# Patient Record
Sex: FEMALE | Race: WHITE | NOT HISPANIC OR LATINO | Employment: FULL TIME | URBAN - METROPOLITAN AREA
[De-identification: names, ages, dates, MRNs, and addresses within clinical notes are randomized per-mention and may not be internally consistent; named-entity substitution may affect disease eponyms.]

---

## 2017-03-06 ENCOUNTER — ALLIED HEALTH/NURSE VISIT (OUTPATIENT)
Dept: FAMILY MEDICINE | Facility: CLINIC | Age: 22
End: 2017-03-06
Payer: COMMERCIAL

## 2017-03-06 DIAGNOSIS — Z23 ENCOUNTER FOR IMMUNIZATION: Primary | ICD-10-CM

## 2017-03-06 PROCEDURE — 90471 IMMUNIZATION ADMIN: CPT

## 2017-03-06 PROCEDURE — 99207 ZZC NO CHARGE NURSE ONLY: CPT

## 2017-03-06 PROCEDURE — 90715 TDAP VACCINE 7 YRS/> IM: CPT

## 2017-03-08 ENCOUNTER — OFFICE VISIT (OUTPATIENT)
Dept: FAMILY MEDICINE | Facility: CLINIC | Age: 22
End: 2017-03-08
Payer: COMMERCIAL

## 2017-03-08 VITALS
HEIGHT: 65 IN | WEIGHT: 130.8 LBS | OXYGEN SATURATION: 99 % | BODY MASS INDEX: 21.79 KG/M2 | TEMPERATURE: 98.6 F | SYSTOLIC BLOOD PRESSURE: 127 MMHG | DIASTOLIC BLOOD PRESSURE: 78 MMHG | HEART RATE: 88 BPM

## 2017-03-08 DIAGNOSIS — N63.15 BREAST LUMP ON RIGHT SIDE AT 9 O'CLOCK POSITION: Primary | ICD-10-CM

## 2017-03-08 PROCEDURE — 99213 OFFICE O/P EST LOW 20 MIN: CPT | Performed by: INTERNAL MEDICINE

## 2017-03-08 NOTE — NURSING NOTE
"Chief Complaint   Patient presents with     Breast Mass     x 4 months, changing in size and texture        Initial /78 (BP Location: Right arm, Patient Position: Chair, Cuff Size: Adult Regular)  Pulse 88  Temp 98.6  F (37  C) (Tympanic)  Ht 5' 4.5\" (1.638 m)  Wt 130 lb 12.8 oz (59.3 kg)  SpO2 99%  BMI 22.11 kg/m2 Estimated body mass index is 22.11 kg/(m^2) as calculated from the following:    Height as of this encounter: 5' 4.5\" (1.638 m).    Weight as of this encounter: 130 lb 12.8 oz (59.3 kg).  Medication Reconciliation: complete   Sherice HARMON CMA (AAMA)    "

## 2017-03-08 NOTE — MR AVS SNAPSHOT
After Visit Summary   3/8/2017    Marian Sanon    MRN: [id number]           Patient Information     Date Of Birth          1995        Visit Information        Provider Department      3/8/2017 9:00 AM Blas Chavez MD [facility]        Today's Diagnoses     Breast lump on right side at 9 o'clock position    -  1       Follow-ups after your visit        Future tests that were ordered for you today     Open Future Orders        Priority Expected Expires Ordered    MA Diagnostic Digital Bilateral Routine  3/8/2018 3/8/2017    US Breast Right Limited 1-3 Quadrants Routine  3/8/2018 3/8/2017            Who to contact     If you have questions or need follow up information about today's clinic visit or your schedule please contact [facility] directly at [phone].  Normal or non-critical lab and imaging results will be communicated to you by MyChart, letter or phone within 4 business days after the clinic has received the results. If you do not hear from us within 7 days, please contact the clinic through MyChart or phone. If you have a critical or abnormal lab result, we will notify you by phone as soon as possible.  Submit refill requests through [employer] or call your pharmacy and they will forward the refill request to us. Please allow 3 business days for your refill to be completed.          Additional Information About Your Visit        MyChart Information     [employer] gives you secure access to your electronic health record. If you see a primary care provider, you can also send messages to your care team and make appointments. If you have questions, please call your primary care clinic.  If you do not have a primary care provider, please call [phone] and they will assist you.        Care EveryWhere ID     This is your Care EveryWhere ID. This could be used by other organizations to access your [place] medical records  [id number]        Your Vitals Were   "   Pulse Temperature Height Pulse Oximetry BMI (Body Mass Index)       88 98.6  F (37  C) (Tympanic) 5' 4.5\" (1.638 m) 99% 22.11 kg/m2        Blood Pressure from Last 3 Encounters:   03/08/17 127/78   11/10/16 117/72   04/01/15 108/54    Weight from Last 3 Encounters:   03/08/17 130 lb 12.8 oz (59.3 kg)   11/10/16 130 lb (59 kg)   04/01/15 132 lb 6.4 oz (60.1 kg)               Primary Care Provider Office Phone #    [facility] [phone] [address] [facility] [phone]        Thank you!     Thank you for choosing [facility]  for your care. Our goal is always to provide you with excellent care. Hearing back from our patients is one way we can continue to improve our services. Please take a few minutes to complete the written survey that you may receive in the mail after your visit with us. Thank you!             Your Updated Medication List - Protect others around you: Learn how to safely use, store and throw away your medicines at www.disposemymeds.org.          This list is accurate as of: 3/8/17  9:17 AM.  Always use your most recent med list.                   Brand Name Dispense Instructions for use    albuterol 108 (90 BASE) MCG/ACT Inhaler    PROAIR HFA/PROVENTIL HFA/VENTOLIN HFA    1 Inhaler    Inhale 2 puffs into the lungs every 4 hours as needed for shortness of breath / dyspnea       etonogestrel 68 MG Impl    IMPLANON/NEXPLANON     1 each (68 mg) by Subdermal route continuous         "

## 2017-03-08 NOTE — PROGRESS NOTES
"  SUBJECTIVE:                                                    Marian Sanon is a 22 year old female who presents to clinic today for the following health issues:  Chief Complaint   Patient presents with     Breast Mass     x 4 months, changing in size and texture      Breast mass       Duration: x 4 months     Description (location/character/radiation): right breast mass, quarter size, patient reports change in size and texture.  Started pea size and firm, now larger and seems more fibrous. Some pain w/ bra touching area too long toward the end of the day, no skin changes or nipple discharge    Intensity:  mild    Accompanying signs and symptoms: as noted     History (similar episodes/previous evaluation): None    Precipitating or alleviating factors: None    Therapies tried and outcome: None     Her aunt has a history of cancer in the breast and ovary at age 30    Problem list and histories reviewed & adjusted, as indicated.  Additional history: as documented    Current Outpatient Prescriptions   Medication Sig Dispense Refill     etonogestrel (IMPLANON/NEXPLANON) 68 MG IMPL 1 each (68 mg) by Subdermal route continuous  0     albuterol (PROAIR HFA, PROVENTIL HFA, VENTOLIN HFA) 108 (90 BASE) MCG/ACT inhaler Inhale 2 puffs into the lungs every 4 hours as needed for shortness of breath / dyspnea 1 Inhaler 1     No Known Allergies    Reviewed and updated as needed this visit by clinical staff  Tobacco  Allergies  Med Hx  Surg Hx  Fam Hx  Soc Hx      Reviewed and updated as needed this visit by Provider         ROS:  Constitutional, breast systems are negative, except as otherwise noted.    OBJECTIVE:                                                    /78 (BP Location: Right arm, Patient Position: Chair, Cuff Size: Adult Regular)  Pulse 88  Temp 98.6  F (37  C) (Tympanic)  Ht 5' 4.5\" (1.638 m)  Wt 130 lb 12.8 oz (59.3 kg)  SpO2 99%  BMI 22.11 kg/m2  Body mass index is 22.11 kg/(m^2).  GENERAL: " healthy, alert and no distress  BREAST: mobile lump in right breast around 9 o'clock about 2-3 cm in size, fibrous in texture, not well defined       ASSESSMENT/PLAN:                                                        1. Breast lump on right side at 9 o'clock position    Will pursue both ultrasound and mammogram for further diagnosis.  Aunt has a history of breast and ovarian cancer at age 30.      - MA Diagnostic Digital Bilateral; Future  - US Breast Right Limited 1-3 Quadrants; Future    Follow-up as needed.      Blas Chavez MD  [facility]

## 2017-03-16 ENCOUNTER — HOSPITAL ENCOUNTER (OUTPATIENT)
Dept: ULTRASOUND IMAGING | Facility: CLINIC | Age: 22
Discharge: HOME | End: 2017-03-16
Attending: INTERNAL MEDICINE | Admitting: INTERNAL MEDICINE
Payer: COMMERCIAL

## 2017-03-16 DIAGNOSIS — N63.15 BREAST LUMP ON RIGHT SIDE AT 9 O'CLOCK POSITION: ICD-10-CM

## 2017-03-16 PROCEDURE — 76642 ULTRASOUND BREAST LIMITED: CPT | Mod: RT

## 2019-11-03 ENCOUNTER — HEALTH MAINTENANCE LETTER (OUTPATIENT)
Age: 24
End: 2019-11-03

## 2020-02-10 ENCOUNTER — HEALTH MAINTENANCE LETTER (OUTPATIENT)
Age: 25
End: 2020-02-10

## 2020-11-16 ENCOUNTER — HEALTH MAINTENANCE LETTER (OUTPATIENT)
Age: 25
End: 2020-11-16

## 2021-09-18 ENCOUNTER — HEALTH MAINTENANCE LETTER (OUTPATIENT)
Age: 26
End: 2021-09-18

## 2022-01-08 ENCOUNTER — HEALTH MAINTENANCE LETTER (OUTPATIENT)
Age: 27
End: 2022-01-08

## 2022-11-19 ENCOUNTER — HEALTH MAINTENANCE LETTER (OUTPATIENT)
Age: 27
End: 2022-11-19

## 2023-04-09 ENCOUNTER — HEALTH MAINTENANCE LETTER (OUTPATIENT)
Age: 28
End: 2023-04-09

## 2023-06-08 DIAGNOSIS — Z31.49 PROCREATIVE INVESTIGATION AND TESTING: Primary | ICD-10-CM

## 2023-06-19 ENCOUNTER — HOSPITAL ENCOUNTER (OUTPATIENT)
Dept: ULTRASOUND IMAGING | Facility: CLINIC | Age: 28
Discharge: HOME | End: 2023-06-19
Admitting: OBSTETRICS & GYNECOLOGY

## 2023-06-19 ENCOUNTER — LAB (OUTPATIENT)
Dept: LAB | Facility: CLINIC | Age: 28
End: 2023-06-19

## 2023-06-19 DIAGNOSIS — Z31.49 INVESTIGATION AND TESTING FOR PROCREATION MANAGEMENT: ICD-10-CM

## 2023-06-19 DIAGNOSIS — Z31.49 PROCREATIVE INVESTIGATION AND TESTING: ICD-10-CM

## 2023-06-19 LAB
ESTRADIOL SERPL-MCNC: 22 PG/ML
PROGEST SERPL-MCNC: 0.2 NG/ML

## 2023-06-19 PROCEDURE — 82670 ASSAY OF TOTAL ESTRADIOL: CPT

## 2023-06-19 PROCEDURE — 36415 COLL VENOUS BLD VENIPUNCTURE: CPT

## 2023-06-19 PROCEDURE — 76830 TRANSVAGINAL US NON-OB: CPT

## 2023-06-20 DIAGNOSIS — Z31.9 UNSPECIFIED PROCREATIVE MANAGEMENT: Primary | ICD-10-CM

## 2023-06-21 ENCOUNTER — HOSPITAL ENCOUNTER (OUTPATIENT)
Dept: ULTRASOUND IMAGING | Facility: CLINIC | Age: 28
Discharge: HOME | End: 2023-06-21
Attending: OBSTETRICS & GYNECOLOGY | Admitting: OBSTETRICS & GYNECOLOGY

## 2023-06-21 ENCOUNTER — LAB (OUTPATIENT)
Dept: LAB | Facility: CLINIC | Age: 28
End: 2023-06-21

## 2023-06-21 DIAGNOSIS — N97.9 INFERTILITY, FEMALE: ICD-10-CM

## 2023-06-21 DIAGNOSIS — Z31.9 UNSPECIFIED PROCREATIVE MANAGEMENT: ICD-10-CM

## 2023-06-21 LAB — ESTRADIOL SERPL-MCNC: 66 PG/ML

## 2023-06-21 PROCEDURE — 82670 ASSAY OF TOTAL ESTRADIOL: CPT

## 2023-06-21 PROCEDURE — 36415 COLL VENOUS BLD VENIPUNCTURE: CPT

## 2023-06-21 PROCEDURE — 76830 TRANSVAGINAL US NON-OB: CPT

## 2023-06-23 DIAGNOSIS — Z31.9 UNSPECIFIED PROCREATIVE MANAGEMENT: Primary | ICD-10-CM

## 2023-06-30 ENCOUNTER — HOSPITAL ENCOUNTER (OUTPATIENT)
Dept: ULTRASOUND IMAGING | Facility: CLINIC | Age: 28
Discharge: HOME | End: 2023-06-30
Admitting: NURSE PRACTITIONER

## 2023-06-30 DIAGNOSIS — Z31.49 INVESTIGATION AND TESTING FOR PROCREATION MANAGEMENT: ICD-10-CM

## 2023-06-30 PROCEDURE — 76830 TRANSVAGINAL US NON-OB: CPT

## 2023-07-06 ENCOUNTER — HOSPITAL ENCOUNTER (OUTPATIENT)
Dept: ULTRASOUND IMAGING | Facility: CLINIC | Age: 28
Discharge: HOME | End: 2023-07-06
Admitting: OBSTETRICS & GYNECOLOGY

## 2023-07-06 ENCOUNTER — LAB (OUTPATIENT)
Dept: LAB | Facility: CLINIC | Age: 28
End: 2023-07-06

## 2023-07-06 DIAGNOSIS — Z31.49 INVESTIGATION AND TESTING FOR PROCREATION MANAGEMENT: ICD-10-CM

## 2023-07-06 DIAGNOSIS — Z31.9 UNSPECIFIED PROCREATIVE MANAGEMENT: ICD-10-CM

## 2023-07-06 LAB
ESTRADIOL SERPL-MCNC: 164 PG/ML
PROGEST SERPL-MCNC: 0.3 NG/ML

## 2023-07-06 PROCEDURE — 76830 TRANSVAGINAL US NON-OB: CPT

## 2023-07-06 PROCEDURE — 82670 ASSAY OF TOTAL ESTRADIOL: CPT

## 2023-07-06 PROCEDURE — 36415 COLL VENOUS BLD VENIPUNCTURE: CPT

## 2023-07-06 PROCEDURE — 84144 ASSAY OF PROGESTERONE: CPT

## 2023-08-07 ENCOUNTER — HOSPITAL ENCOUNTER (OUTPATIENT)
Dept: ULTRASOUND IMAGING | Facility: CLINIC | Age: 28
Discharge: HOME | End: 2023-08-07
Attending: OBSTETRICS & GYNECOLOGY | Admitting: OBSTETRICS & GYNECOLOGY

## 2023-08-07 DIAGNOSIS — Z31.9 ENCOUNTER FOR PROCREATIVE MANAGEMENT, UNSPECIFIED: ICD-10-CM

## 2023-08-07 PROCEDURE — 76801 OB US < 14 WKS SINGLE FETUS: CPT

## 2023-08-14 ENCOUNTER — LAB (OUTPATIENT)
Dept: LAB | Facility: CLINIC | Age: 28
End: 2023-08-14

## 2023-08-14 ENCOUNTER — HOSPITAL ENCOUNTER (OUTPATIENT)
Dept: ULTRASOUND IMAGING | Facility: CLINIC | Age: 28
Discharge: HOME | End: 2023-08-14
Attending: OBSTETRICS & GYNECOLOGY | Admitting: OBSTETRICS & GYNECOLOGY

## 2023-08-14 DIAGNOSIS — Z31.9 ENCOUNTER FOR INFERTILITY: Primary | ICD-10-CM

## 2023-08-14 DIAGNOSIS — Z31.9 ENCOUNTER FOR PROCREATIVE MANAGEMENT, UNSPECIFIED: ICD-10-CM

## 2023-08-14 LAB
ERYTHROCYTE [DISTWIDTH] IN BLOOD BY AUTOMATED COUNT: 11.7 % (ref 10–15)
HCT VFR BLD AUTO: 35.8 % (ref 35–47)
HGB BLD-MCNC: 11.9 G/DL (ref 11.7–15.7)
MCH RBC QN AUTO: 29.5 PG (ref 26.5–33)
MCHC RBC AUTO-ENTMCNC: 33.2 G/DL (ref 31.5–36.5)
MCV RBC AUTO: 89 FL (ref 78–100)
PLATELET # BLD AUTO: 353 10E3/UL (ref 150–450)
RBC # BLD AUTO: 4.04 10E6/UL (ref 3.8–5.2)
WBC # BLD AUTO: 10.2 10E3/UL (ref 4–11)

## 2023-08-14 PROCEDURE — 85027 COMPLETE CBC AUTOMATED: CPT

## 2023-08-14 PROCEDURE — 36415 COLL VENOUS BLD VENIPUNCTURE: CPT

## 2023-08-14 PROCEDURE — 76801 OB US < 14 WKS SINGLE FETUS: CPT

## 2023-08-28 ENCOUNTER — HOSPITAL ENCOUNTER (OUTPATIENT)
Dept: ULTRASOUND IMAGING | Facility: CLINIC | Age: 28
Discharge: HOME | End: 2023-08-28
Attending: OBSTETRICS & GYNECOLOGY | Admitting: OBSTETRICS & GYNECOLOGY

## 2023-08-28 DIAGNOSIS — Z31.9 ENCOUNTER FOR PROCREATIVE MANAGEMENT, UNSPECIFIED: ICD-10-CM

## 2023-08-28 PROCEDURE — 76801 OB US < 14 WKS SINGLE FETUS: CPT

## 2024-04-11 ENCOUNTER — HOSPITAL ENCOUNTER (EMERGENCY)
Facility: CLINIC | Age: 29
Discharge: HOME | End: 2024-04-11
Attending: EMERGENCY MEDICINE | Admitting: EMERGENCY MEDICINE
Payer: COMMERCIAL

## 2024-04-11 VITALS
DIASTOLIC BLOOD PRESSURE: 60 MMHG | OXYGEN SATURATION: 96 % | TEMPERATURE: 97.6 F | BODY MASS INDEX: 28.16 KG/M2 | HEIGHT: 65 IN | WEIGHT: 169 LBS | HEART RATE: 65 BPM | SYSTOLIC BLOOD PRESSURE: 92 MMHG | RESPIRATION RATE: 18 BRPM

## 2024-04-11 DIAGNOSIS — K80.20 SYMPTOMATIC CHOLELITHIASIS: ICD-10-CM

## 2024-04-11 LAB
ALBUMIN SERPL BCG-MCNC: 4.3 G/DL (ref 3.5–5.2)
ALBUMIN UR-MCNC: NEGATIVE MG/DL
ALP SERPL-CCNC: 132 U/L (ref 40–150)
ALT SERPL W P-5'-P-CCNC: 35 U/L (ref 0–50)
ANION GAP SERPL CALCULATED.3IONS-SCNC: 11 MMOL/L (ref 7–15)
APPEARANCE UR: CLEAR
AST SERPL W P-5'-P-CCNC: 31 U/L (ref 0–45)
BASOPHILS # BLD AUTO: 0.1 10E3/UL (ref 0–0.2)
BASOPHILS NFR BLD AUTO: 1 %
BILIRUB DIRECT SERPL-MCNC: <0.2 MG/DL (ref 0–0.3)
BILIRUB SERPL-MCNC: 0.2 MG/DL
BILIRUB UR QL STRIP: NEGATIVE
BUN SERPL-MCNC: 15.2 MG/DL (ref 6–20)
CALCIUM SERPL-MCNC: 9.7 MG/DL (ref 8.6–10)
CHLORIDE SERPL-SCNC: 102 MMOL/L (ref 98–107)
COLOR UR AUTO: YELLOW
CREAT SERPL-MCNC: 0.72 MG/DL (ref 0.51–0.95)
DEPRECATED HCO3 PLAS-SCNC: 25 MMOL/L (ref 22–29)
EGFRCR SERPLBLD CKD-EPI 2021: >90 ML/MIN/1.73M2
EOSINOPHIL # BLD AUTO: 0.3 10E3/UL (ref 0–0.7)
EOSINOPHIL NFR BLD AUTO: 5 %
ERYTHROCYTE [DISTWIDTH] IN BLOOD BY AUTOMATED COUNT: 12.5 % (ref 10–15)
GLUCOSE SERPL-MCNC: 96 MG/DL (ref 70–99)
GLUCOSE UR STRIP-MCNC: NEGATIVE MG/DL
HCT VFR BLD AUTO: 40.5 % (ref 35–47)
HGB BLD-MCNC: 13.3 G/DL (ref 11.7–15.7)
HGB UR QL STRIP: ABNORMAL
IMM GRANULOCYTES # BLD: 0 10E3/UL
IMM GRANULOCYTES NFR BLD: 0 %
KETONES UR STRIP-MCNC: NEGATIVE MG/DL
LEUKOCYTE ESTERASE UR QL STRIP: ABNORMAL
LIPASE SERPL-CCNC: 99 U/L (ref 13–60)
LYMPHOCYTES # BLD AUTO: 2.6 10E3/UL (ref 0.8–5.3)
LYMPHOCYTES NFR BLD AUTO: 41 %
MCH RBC QN AUTO: 30 PG (ref 26.5–33)
MCHC RBC AUTO-ENTMCNC: 32.8 G/DL (ref 31.5–36.5)
MCV RBC AUTO: 91 FL (ref 78–100)
MONOCYTES # BLD AUTO: 0.5 10E3/UL (ref 0–1.3)
MONOCYTES NFR BLD AUTO: 8 %
MUCOUS THREADS #/AREA URNS LPF: PRESENT /LPF
NEUTROPHILS # BLD AUTO: 2.9 10E3/UL (ref 1.6–8.3)
NEUTROPHILS NFR BLD AUTO: 45 %
NITRATE UR QL: NEGATIVE
NRBC # BLD AUTO: 0 10E3/UL
NRBC BLD AUTO-RTO: 0 /100
PH UR STRIP: 5 [PH] (ref 5–7)
PLATELET # BLD AUTO: 316 10E3/UL (ref 150–450)
POTASSIUM SERPL-SCNC: 4.2 MMOL/L (ref 3.4–5.3)
PROT SERPL-MCNC: 7.5 G/DL (ref 6.4–8.3)
RBC # BLD AUTO: 4.44 10E6/UL (ref 3.8–5.2)
RBC URINE: 26 /HPF
SODIUM SERPL-SCNC: 138 MMOL/L (ref 135–145)
SP GR UR STRIP: 1.03 (ref 1–1.03)
SQUAMOUS EPITHELIAL: 1 /HPF
UROBILINOGEN UR STRIP-MCNC: NORMAL MG/DL
WBC # BLD AUTO: 6.5 10E3/UL (ref 4–11)
WBC URINE: 4 /HPF

## 2024-04-11 PROCEDURE — 80076 HEPATIC FUNCTION PANEL: CPT | Performed by: EMERGENCY MEDICINE

## 2024-04-11 PROCEDURE — 36415 COLL VENOUS BLD VENIPUNCTURE: CPT | Performed by: EMERGENCY MEDICINE

## 2024-04-11 PROCEDURE — 99284 EMERGENCY DEPT VISIT MOD MDM: CPT | Mod: 25 | Performed by: EMERGENCY MEDICINE

## 2024-04-11 PROCEDURE — 81001 URINALYSIS AUTO W/SCOPE: CPT | Performed by: EMERGENCY MEDICINE

## 2024-04-11 PROCEDURE — 85025 COMPLETE CBC W/AUTO DIFF WBC: CPT | Performed by: EMERGENCY MEDICINE

## 2024-04-11 PROCEDURE — 80048 BASIC METABOLIC PNL TOTAL CA: CPT | Performed by: EMERGENCY MEDICINE

## 2024-04-11 PROCEDURE — 76705 ECHO EXAM OF ABDOMEN: CPT | Performed by: EMERGENCY MEDICINE

## 2024-04-11 PROCEDURE — 76705 ECHO EXAM OF ABDOMEN: CPT | Mod: 26 | Performed by: EMERGENCY MEDICINE

## 2024-04-11 PROCEDURE — 83690 ASSAY OF LIPASE: CPT | Performed by: EMERGENCY MEDICINE

## 2024-04-11 RX ORDER — MORPHINE SULFATE 15 MG/1
15 TABLET ORAL EVERY 4 HOURS PRN
Qty: 6 TABLET | Refills: 0 | Status: SHIPPED | OUTPATIENT
Start: 2024-04-11 | End: 2024-05-23

## 2024-04-11 RX ORDER — HYDROMORPHONE HYDROCHLORIDE 1 MG/ML
0.5 INJECTION, SOLUTION INTRAMUSCULAR; INTRAVENOUS; SUBCUTANEOUS
Status: DISCONTINUED | OUTPATIENT
Start: 2024-04-11 | End: 2024-04-11 | Stop reason: HOSPADM

## 2024-04-11 ASSESSMENT — ACTIVITIES OF DAILY LIVING (ADL)
ADLS_ACUITY_SCORE: 35
ADLS_ACUITY_SCORE: 35

## 2024-04-11 ASSESSMENT — COLUMBIA-SUICIDE SEVERITY RATING SCALE - C-SSRS
6. HAVE YOU EVER DONE ANYTHING, STARTED TO DO ANYTHING, OR PREPARED TO DO ANYTHING TO END YOUR LIFE?: NO
1. IN THE PAST MONTH, HAVE YOU WISHED YOU WERE DEAD OR WISHED YOU COULD GO TO SLEEP AND NOT WAKE UP?: NO
2. HAVE YOU ACTUALLY HAD ANY THOUGHTS OF KILLING YOURSELF IN THE PAST MONTH?: NO

## 2024-04-11 NOTE — ED TRIAGE NOTES
Pt states she developed upper right abdominal pain that started last night around 2300, 2.5 weeks post partum     Triage Assessment (Adult)       Row Name 04/11/24 0408          Triage Assessment    Airway WDL WDL        Respiratory WDL    Respiratory WDL WDL        Skin Circulation/Temperature WDL    Skin Circulation/Temperature WDL WDL        Cardiac WDL    Cardiac WDL WDL        Peripheral/Neurovascular WDL    Peripheral Neurovascular WDL WDL        Cognitive/Neuro/Behavioral WDL    Cognitive/Neuro/Behavioral WDL WDL

## 2024-04-11 NOTE — DISCHARGE INSTRUCTIONS
I believe you likely had pain from a gallstone that caused blockage of flow from your gallbladder.  I am glad you are feeling better now.  Return to the emergency department for worsening symptoms, severe pain you cannot control at home, repeated vomiting, or other concerns.  Otherwise follow-up in surgery clinic to discuss possible surgery to remove your gallbladder in the future.    Use ibuprofen and acetaminophen for your symptoms.  Use morphine as needed for pain that is not controlled by the prior two medications.    Morphine is an addictive opioid medication, please only take it when the pain is more than can be controlled by acetaminophen or ibuprofen alone. It will also make you lightheaded, nauseated, and constipated.  Do not drive, operate heavy machinery, or take care of young children while taking this medication. Do not mix it with other medications or drugs that will make you sleepy, such as alcohol.     Repeated studies have shown that the longer you use opioid pain medications, the longer it is until you return to normal function. It is our recommendation that you taper off opioids as quickly as possible with the goal of returning to normal function or near normal function. Long term use of opioids quickly results in growing tolerance to the medication (less of the benefits) and increased dependence (more of the bad side effects).     Pain is very difficult to treat and we can very rarely take away pain completely. If you are having difficulty with pain over several weeks after an injury, you may need to start different medications and therapies (such as physical therapy, graded exercise, massage, and acupuncture). Please talk about this with your regular doctor.     If you are interested in seeking free, confidential treatment referral and information service for individuals and families facing mental health and/or substance use disorders please call [phone] ([phone])

## 2024-04-11 NOTE — H&P (VIEW-ONLY)
History     Chief Complaint   Patient presents with    Abdominal Pain     HPI  Marian Sanon is a 29 year old female who presents for epigastric and right upper quadrant abdominal pain.  Symptoms started last evening, severe pain radiating to her back.  Some nausea and sweating.  She is feeling better now and it just started to improve as she got to the emergency department but had been bothering her for several hours.  No fevers.  She denies cough or chest pain.  No diarrhea, dysuria, hematuria.  She just had a normal vaginal delivery 2.5 weeks ago of a surrogacy pregnancy.  Still having some vaginal bleeding but is significant decreasing.  No abnormal vaginal discharge.  She had a similar episode the evening prior that resolved spontaneously in a shorter period of time.    Allergies:  No Known Allergies    Problem List:    Patient Active Problem List    Diagnosis Date Noted    Encounter for supervision of other normal pregnancy 02/13/2015     Priority: Medium     Diagnosis updated by automated process. Provider to review and confirm.      GBS (group B Streptococcus carrier), +RV culture, currently pregnant 01/09/2015     Priority: Medium      Beta hemolytic streptococcus group b isolated         AMPICILLIN JOSE MANUEL GRAM POS PANEL <=0.06 Susceptible ug/mL Final         CEFOTAXIME JOSE MANUEL GRAM POS PANEL <=0.25 Susceptible ug/mL Final         CEFTRIAXONE JOSE MANUEL GRAM POS PANEL <=0.25 Susceptible ug/mL Final         CLINDAMYCIN JOSE MANUEL GRAM POS PANEL <=0.06 Susceptible ug/mL Final         PENICILLIN JOSE MANUEL GRAM POS PANEL <=0.03 Susceptible ug/mL Final         VANCOMYCIN JOSE MANUEL GRAM POS PANEL 0.5 Susceptible ug/mL Final                Anemia 08/07/2014     Priority: Medium    Supervision of normal first pregnancy 07/14/2014     Priority: Medium     Its a boy Teddy      Tobacco abuse 01/20/2014     Priority: Medium    Anxiety 08/21/2013     Priority: Medium    Need for vaccination 08/06/2013     Priority: Medium     Due for menactra  "booster      Moderate major depression (H) 08/06/2013     Priority: Medium     Tried to hang self.  Is in counseling, has contract for safety.  Also weekly Panic attacks.  See 8/6/13 visit for details.  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     TONSILLECTOMY & ADENOIDECTOMY         Family History:    Family History   Problem Relation Age of Onset    Respiratory Mother         asthma    Cancer Paternal Grandmother     Cancer Paternal Grandfather         lung    Cardiovascular Maternal Grandmother         MI- stent    Breast Cancer No family hx of        Social History:  Marital Status:   [2]  Social History     Tobacco Use    Smoking status: Former    Smokeless tobacco: Never    Tobacco comments:     3 cigarettes per day- quit with pregnancy   Substance Use Topics    Alcohol use: Yes     Comment: occas- quit with pregnancy    Drug use: Yes     Types: Marijuana     Comment: quit with pregnancy        Medications:    morphine (MSIR) 15 MG IR tablet  albuterol (PROAIR HFA, PROVENTIL HFA, VENTOLIN HFA) 108 (90 BASE) MCG/ACT inhaler  etonogestrel (IMPLANON/NEXPLANON) 68 MG IMPL          Review of Systems    Physical Exam   BP: (!) 121/92  Pulse: 76  Temp: 97.6  F (36.4  C)  Resp: 18  Height: 165.1 cm (5' 5\")  Weight: 76.7 kg (169 lb)  SpO2: 98 %      Physical Exam  Constitutional:       General: She is not in acute distress.     Appearance: Normal appearance. She is well-developed.   HENT:      Head: Normocephalic and atraumatic.   Eyes:      General: No scleral icterus.     " Conjunctiva/sclera: Conjunctivae normal.   Cardiovascular:      Rate and Rhythm: Normal rate.   Pulmonary:      Effort: Pulmonary effort is normal. No respiratory distress.   Abdominal:      General: Abdomen is flat.      Tenderness: There is abdominal tenderness in the right upper quadrant. There is no guarding or rebound.   Musculoskeletal:      Cervical back: Normal range of motion and neck supple.   Skin:     General: Skin is warm and dry.      Findings: No rash.   Neurological:      Mental Status: She is alert and oriented to person, place, and time.         ED Course        Procedures    Results for orders placed during the hospital encounter of 04/11/24    POC US ABDOMEN LIMITED    [facility] Procedure Note    Limited Bedside ED Gallbladder  Ultrasound:    PROCEDURE: PERFORMED BY: Dr. Lui Mcnulty MD  INDICATIONS:  RUQ/Epigastric Pain  PROBE:  Low frequency convex probe  BODY LOCATION: Abdomen  FINDINGS:   An ultrasound of the gallbladder was performed using longitudinal and transverse views.  Gallstone(s):  Present  Gallbladder sludge:  Absent  Sonographic Pena's sign:  Absent  Gallbladder wall thickening (greater than 4 mm):  Absent  Pericholecystic fluid: Absent  Common bile duct (dilated if internal diameter greater than 6 mm): normal  INTERPRETATION: Cholelithiasis without signs of cholecystitis  IMAGE DOCUMENTATION: Images were archived to PACs system.            Critical Care time:  none               Results for orders placed or performed during the hospital encounter of 04/11/24 (from the past 24 hour(s))   Lipase   Result Value Ref Range    Lipase 99 (H) 13 - 60 U/L   CBC with platelets, differential    Narrative    The following orders were created for panel order CBC with platelets, differential.  Procedure                               Abnormality         Status                     ---------                               -----------         ------                      CBC with platelets and d...[[id number]]                      Final result                 Please view results for these tests on the individual orders.   Basic metabolic panel   Result Value Ref Range    Sodium 138 135 - 145 mmol/L    Potassium 4.2 3.4 - 5.3 mmol/L    Chloride 102 98 - 107 mmol/L    Carbon Dioxide (CO2) 25 22 - 29 mmol/L    Anion Gap 11 7 - 15 mmol/L    Urea Nitrogen 15.2 6.0 - 20.0 mg/dL    Creatinine 0.72 0.51 - 0.95 mg/dL    GFR Estimate >90 >60 mL/min/1.73m2    Calcium 9.7 8.6 - 10.0 mg/dL    Glucose 96 70 - 99 mg/dL   CBC with platelets and differential   Result Value Ref Range    WBC Count 6.5 4.0 - 11.0 10e3/uL    RBC Count 4.44 3.80 - 5.20 10e6/uL    Hemoglobin 13.3 11.7 - 15.7 g/dL    Hematocrit 40.5 35.0 - 47.0 %    MCV 91 78 - 100 fL    MCH 30.0 26.5 - 33.0 pg    MCHC 32.8 31.5 - 36.5 g/dL    RDW 12.5 10.0 - 15.0 %    Platelet Count 316 150 - 450 10e3/uL    % Neutrophils 45 %    % Lymphocytes 41 %    % Monocytes 8 %    % Eosinophils 5 %    % Basophils 1 %    % Immature Granulocytes 0 %    NRBCs per 100 WBC 0 <1 /100    Absolute Neutrophils 2.9 1.6 - 8.3 10e3/uL    Absolute Lymphocytes 2.6 0.8 - 5.3 10e3/uL    Absolute Monocytes 0.5 0.0 - 1.3 10e3/uL    Absolute Eosinophils 0.3 0.0 - 0.7 10e3/uL    Absolute Basophils 0.1 0.0 - 0.2 10e3/uL    Absolute Immature Granulocytes 0.0 <=0.4 10e3/uL    Absolute NRBCs 0.0 10e3/uL   Hepatic panel   Result Value Ref Range    Protein Total 7.5 6.4 - 8.3 g/dL    Albumin 4.3 3.5 - 5.2 g/dL    Bilirubin Total 0.2 <=1.2 mg/dL    Alkaline Phosphatase 132 40 - 150 U/L    AST 31 0 - 45 U/L    ALT 35 0 - 50 U/L    Bilirubin Direct <0.20 0.00 - 0.30 mg/dL   UA with Microscopic reflex to Culture    Specimen: Urine, Clean Catch   Result Value Ref Range    Color Urine Yellow Colorless, Straw, Light Yellow, Yellow    Appearance Urine Clear Clear    Glucose Urine Negative Negative mg/dL    Bilirubin Urine Negative Negative    Ketones Urine Negative Negative mg/dL     Specific Gravity Urine 1.030 1.003 - 1.035    Blood Urine Large (A) Negative    pH Urine 5.0 5.0 - 7.0    Protein Albumin Urine Negative Negative mg/dL    Urobilinogen Urine Normal Normal, 2.0 mg/dL    Nitrite Urine Negative Negative    Leukocyte Esterase Urine Small (A) Negative    Mucus Urine Present (A) None Seen /LPF    RBC Urine 26 (H) <=2 /HPF    WBC Urine 4 <=5 /HPF    Squamous Epithelials Urine 1 <=1 /HPF    Narrative    Urine Culture not indicated   POC US ABDOMEN LIMITED    Impression    [facility] Procedure Note      Limited Bedside ED Gallbladder  Ultrasound:    PROCEDURE: PERFORMED BY: Dr. Lui Mcnulty MD  INDICATIONS:  RUQ/Epigastric Pain  PROBE:  Low frequency convex probe  BODY LOCATION: Abdomen  FINDINGS:   An ultrasound of the gallbladder was performed using longitudinal and transverse views.  Gallstone(s):  Present  Gallbladder sludge:  Absent  Sonographic Pena's sign:  Absent  Gallbladder wall thickening (greater than 4 mm):  Absent  Pericholecystic fluid: Absent  Common bile duct (dilated if internal diameter greater than 6 mm): normal   INTERPRETATION: Cholelithiasis without signs of cholecystitis  IMAGE DOCUMENTATION: Images were archived to PACs system.          Medications   HYDROmorphone (PF) (DILAUDID) injection 0.5 mg (has no administration in time range)       Assessments & Plan (with Medical Decision Making)   29-year-old female presents with right upper quadrant and epigastric abdominal pain.  Vital signs are reassuring.  Bedside ultrasound shows cholelithiasis without signs of cholecystitis.  Urinalysis does have some blood in it and this is likely related to the vaginal bleeding still.  White blood cell count is 6.5 which is reassuring and her hemoglobin is 13.3, no signs of anemia.  Electrolytes are within normal limits.  She does have a mild elevation of her lipase at 99, not elevated enough to be consistent with acute pancreatitis however.  LFTs reassuring.  On  recheck she is feeling better, tolerating oral intake.  We discussed hospital admission for surgical evaluation versus home management the patient feels comfortable going home at this time.  She is told to return if she has fevers, worsening pain, repeated vomiting, or other concerns.  Otherwise follow-up in surgery clinic.  I did prescribe a short course of morphine to help with severe pain.  The patient is in agreement with this plan.    I have reviewed the nursing notes.    I have reviewed the findings, diagnosis, plan and need for follow up with the patient.       Discharge Medication List as of 4/11/2024  6:10 AM        START taking these medications    Details   morphine (MSIR) 15 MG IR tablet Take 1 tablet (15 mg) by mouth every 4 hours as needed for severe pain, Disp-6 tablet, R-0, E-Prescribe             Final diagnoses:   Symptomatic cholelithiasis       4/11/2024   [facility] EMERGENCY DEPT       Lui Mcnulty MD  04/11/24 0684

## 2024-04-11 NOTE — ED NOTES
Pt declines pain meds at this time. Pt states pain is much improved. Attempting PO challenge per MD orders. Will continue close pt monitors.

## 2024-04-11 NOTE — ED PROVIDER NOTES
History     Chief Complaint   Patient presents with    Abdominal Pain     HPI  Marian Sanon is a 29 year old female who presents for epigastric and right upper quadrant abdominal pain.  Symptoms started last evening, severe pain radiating to her back.  Some nausea and sweating.  She is feeling better now and it just started to improve as she got to the emergency department but had been bothering her for several hours.  No fevers.  She denies cough or chest pain.  No diarrhea, dysuria, hematuria.  She just had a normal vaginal delivery 2.5 weeks ago of a surrogacy pregnancy.  Still having some vaginal bleeding but is significant decreasing.  No abnormal vaginal discharge.  She had a similar episode the evening prior that resolved spontaneously in a shorter period of time.    Allergies:  No Known Allergies    Problem List:    Patient Active Problem List    Diagnosis Date Noted    Encounter for supervision of other normal pregnancy 02/13/2015     Priority: Medium     Diagnosis updated by automated process. Provider to review and confirm.      GBS (group B Streptococcus carrier), +RV culture, currently pregnant 01/09/2015     Priority: Medium      Beta hemolytic streptococcus group b isolated         AMPICILLIN JOSE MANUEL GRAM POS PANEL <=0.06 Susceptible ug/mL Final         CEFOTAXIME JOSE MANUEL GRAM POS PANEL <=0.25 Susceptible ug/mL Final         CEFTRIAXONE JOSE MANUEL GRAM POS PANEL <=0.25 Susceptible ug/mL Final         CLINDAMYCIN JOSE MANUEL GRAM POS PANEL <=0.06 Susceptible ug/mL Final         PENICILLIN JOSE MANUEL GRAM POS PANEL <=0.03 Susceptible ug/mL Final         VANCOMYCIN JOSE MANUEL GRAM POS PANEL 0.5 Susceptible ug/mL Final                Anemia 08/07/2014     Priority: Medium    Supervision of normal first pregnancy 07/14/2014     Priority: Medium     Its a boy Teddy      Tobacco abuse 01/20/2014     Priority: Medium    Anxiety 08/21/2013     Priority: Medium    Need for vaccination 08/06/2013     Priority: Medium     Due for menactra  "booster      Moderate major depression (H) 08/06/2013     Priority: Medium     Tried to hang self.  Is in counseling, has contract for safety.  Also weekly Panic attacks.  See 8/6/13 visit for details.  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     TONSILLECTOMY & ADENOIDECTOMY         Family History:    Family History   Problem Relation Age of Onset    Respiratory Mother         asthma    Cancer Paternal Grandmother     Cancer Paternal Grandfather         lung    Cardiovascular Maternal Grandmother         MI- stent    Breast Cancer No family hx of        Social History:  Marital Status:   [2]  Social History     Tobacco Use    Smoking status: Former    Smokeless tobacco: Never    Tobacco comments:     3 cigarettes per day- quit with pregnancy   Substance Use Topics    Alcohol use: Yes     Comment: occas- quit with pregnancy    Drug use: Yes     Types: Marijuana     Comment: quit with pregnancy        Medications:    morphine (MSIR) 15 MG IR tablet  albuterol (PROAIR HFA, PROVENTIL HFA, VENTOLIN HFA) 108 (90 BASE) MCG/ACT inhaler  etonogestrel (IMPLANON/NEXPLANON) 68 MG IMPL          Review of Systems    Physical Exam   BP: (!) 121/92  Pulse: 76  Temp: 97.6  F (36.4  C)  Resp: 18  Height: 165.1 cm (5' 5\")  Weight: 76.7 kg (169 lb)  SpO2: 98 %      Physical Exam  Constitutional:       General: She is not in acute distress.     Appearance: Normal appearance. She is well-developed.   HENT:      Head: Normocephalic and atraumatic.   Eyes:      General: No scleral icterus.     " Conjunctiva/sclera: Conjunctivae normal.   Cardiovascular:      Rate and Rhythm: Normal rate.   Pulmonary:      Effort: Pulmonary effort is normal. No respiratory distress.   Abdominal:      General: Abdomen is flat.      Tenderness: There is abdominal tenderness in the right upper quadrant. There is no guarding or rebound.   Musculoskeletal:      Cervical back: Normal range of motion and neck supple.   Skin:     General: Skin is warm and dry.      Findings: No rash.   Neurological:      Mental Status: She is alert and oriented to person, place, and time.         ED Course        Procedures    Results for orders placed during the hospital encounter of 04/11/24    POC US ABDOMEN LIMITED    [facility] Procedure Note    Limited Bedside ED Gallbladder  Ultrasound:    PROCEDURE: PERFORMED BY: Dr. Lui Mcnulty MD  INDICATIONS:  RUQ/Epigastric Pain  PROBE:  Low frequency convex probe  BODY LOCATION: Abdomen  FINDINGS:   An ultrasound of the gallbladder was performed using longitudinal and transverse views.  Gallstone(s):  Present  Gallbladder sludge:  Absent  Sonographic Pena's sign:  Absent  Gallbladder wall thickening (greater than 4 mm):  Absent  Pericholecystic fluid: Absent  Common bile duct (dilated if internal diameter greater than 6 mm): normal  INTERPRETATION: Cholelithiasis without signs of cholecystitis  IMAGE DOCUMENTATION: Images were archived to PACs system.            Critical Care time:  none               Results for orders placed or performed during the hospital encounter of 04/11/24 (from the past 24 hour(s))   Lipase   Result Value Ref Range    Lipase 99 (H) 13 - 60 U/L   CBC with platelets, differential    Narrative    The following orders were created for panel order CBC with platelets, differential.  Procedure                               Abnormality         Status                     ---------                               -----------         ------                      CBC with platelets and d...[[id number]]                      Final result                 Please view results for these tests on the individual orders.   Basic metabolic panel   Result Value Ref Range    Sodium 138 135 - 145 mmol/L    Potassium 4.2 3.4 - 5.3 mmol/L    Chloride 102 98 - 107 mmol/L    Carbon Dioxide (CO2) 25 22 - 29 mmol/L    Anion Gap 11 7 - 15 mmol/L    Urea Nitrogen 15.2 6.0 - 20.0 mg/dL    Creatinine 0.72 0.51 - 0.95 mg/dL    GFR Estimate >90 >60 mL/min/1.73m2    Calcium 9.7 8.6 - 10.0 mg/dL    Glucose 96 70 - 99 mg/dL   CBC with platelets and differential   Result Value Ref Range    WBC Count 6.5 4.0 - 11.0 10e3/uL    RBC Count 4.44 3.80 - 5.20 10e6/uL    Hemoglobin 13.3 11.7 - 15.7 g/dL    Hematocrit 40.5 35.0 - 47.0 %    MCV 91 78 - 100 fL    MCH 30.0 26.5 - 33.0 pg    MCHC 32.8 31.5 - 36.5 g/dL    RDW 12.5 10.0 - 15.0 %    Platelet Count 316 150 - 450 10e3/uL    % Neutrophils 45 %    % Lymphocytes 41 %    % Monocytes 8 %    % Eosinophils 5 %    % Basophils 1 %    % Immature Granulocytes 0 %    NRBCs per 100 WBC 0 <1 /100    Absolute Neutrophils 2.9 1.6 - 8.3 10e3/uL    Absolute Lymphocytes 2.6 0.8 - 5.3 10e3/uL    Absolute Monocytes 0.5 0.0 - 1.3 10e3/uL    Absolute Eosinophils 0.3 0.0 - 0.7 10e3/uL    Absolute Basophils 0.1 0.0 - 0.2 10e3/uL    Absolute Immature Granulocytes 0.0 <=0.4 10e3/uL    Absolute NRBCs 0.0 10e3/uL   Hepatic panel   Result Value Ref Range    Protein Total 7.5 6.4 - 8.3 g/dL    Albumin 4.3 3.5 - 5.2 g/dL    Bilirubin Total 0.2 <=1.2 mg/dL    Alkaline Phosphatase 132 40 - 150 U/L    AST 31 0 - 45 U/L    ALT 35 0 - 50 U/L    Bilirubin Direct <0.20 0.00 - 0.30 mg/dL   UA with Microscopic reflex to Culture    Specimen: Urine, Clean Catch   Result Value Ref Range    Color Urine Yellow Colorless, Straw, Light Yellow, Yellow    Appearance Urine Clear Clear    Glucose Urine Negative Negative mg/dL    Bilirubin Urine Negative Negative    Ketones Urine Negative Negative mg/dL     Specific Gravity Urine 1.030 1.003 - 1.035    Blood Urine Large (A) Negative    pH Urine 5.0 5.0 - 7.0    Protein Albumin Urine Negative Negative mg/dL    Urobilinogen Urine Normal Normal, 2.0 mg/dL    Nitrite Urine Negative Negative    Leukocyte Esterase Urine Small (A) Negative    Mucus Urine Present (A) None Seen /LPF    RBC Urine 26 (H) <=2 /HPF    WBC Urine 4 <=5 /HPF    Squamous Epithelials Urine 1 <=1 /HPF    Narrative    Urine Culture not indicated   POC US ABDOMEN LIMITED    Impression    [facility] Procedure Note      Limited Bedside ED Gallbladder  Ultrasound:    PROCEDURE: PERFORMED BY: Dr. Lui Mcnulty MD  INDICATIONS:  RUQ/Epigastric Pain  PROBE:  Low frequency convex probe  BODY LOCATION: Abdomen  FINDINGS:   An ultrasound of the gallbladder was performed using longitudinal and transverse views.  Gallstone(s):  Present  Gallbladder sludge:  Absent  Sonographic Pena's sign:  Absent  Gallbladder wall thickening (greater than 4 mm):  Absent  Pericholecystic fluid: Absent  Common bile duct (dilated if internal diameter greater than 6 mm): normal   INTERPRETATION: Cholelithiasis without signs of cholecystitis  IMAGE DOCUMENTATION: Images were archived to PACs system.          Medications   HYDROmorphone (PF) (DILAUDID) injection 0.5 mg (has no administration in time range)       Assessments & Plan (with Medical Decision Making)   29-year-old female presents with right upper quadrant and epigastric abdominal pain.  Vital signs are reassuring.  Bedside ultrasound shows cholelithiasis without signs of cholecystitis.  Urinalysis does have some blood in it and this is likely related to the vaginal bleeding still.  White blood cell count is 6.5 which is reassuring and her hemoglobin is 13.3, no signs of anemia.  Electrolytes are within normal limits.  She does have a mild elevation of her lipase at 99, not elevated enough to be consistent with acute pancreatitis however.  LFTs reassuring.  On  recheck she is feeling better, tolerating oral intake.  We discussed hospital admission for surgical evaluation versus home management the patient feels comfortable going home at this time.  She is told to return if she has fevers, worsening pain, repeated vomiting, or other concerns.  Otherwise follow-up in surgery clinic.  I did prescribe a short course of morphine to help with severe pain.  The patient is in agreement with this plan.    I have reviewed the nursing notes.    I have reviewed the findings, diagnosis, plan and need for follow up with the patient.       Discharge Medication List as of 4/11/2024  6:10 AM        START taking these medications    Details   morphine (MSIR) 15 MG IR tablet Take 1 tablet (15 mg) by mouth every 4 hours as needed for severe pain, Disp-6 tablet, R-0, E-Prescribe             Final diagnoses:   Symptomatic cholelithiasis       4/11/2024   [facility] EMERGENCY DEPT       Lui Mcnulty MD  04/11/24 0645

## 2024-04-17 ENCOUNTER — OFFICE VISIT (OUTPATIENT)
Dept: SURGERY | Facility: CLINIC | Age: 29
End: 2024-04-17
Payer: COMMERCIAL

## 2024-04-17 ENCOUNTER — TELEPHONE (OUTPATIENT)
Dept: SURGERY | Facility: CLINIC | Age: 29
End: 2024-04-17

## 2024-04-17 VITALS
HEIGHT: 65 IN | HEART RATE: 60 BPM | SYSTOLIC BLOOD PRESSURE: 108 MMHG | DIASTOLIC BLOOD PRESSURE: 71 MMHG | TEMPERATURE: 97.5 F | WEIGHT: 169.09 LBS | BODY MASS INDEX: 28.17 KG/M2

## 2024-04-17 DIAGNOSIS — Z72.0 TOBACCO ABUSE: ICD-10-CM

## 2024-04-17 DIAGNOSIS — K80.20 SYMPTOMATIC CHOLELITHIASIS: Primary | ICD-10-CM

## 2024-04-17 PROCEDURE — 99204 OFFICE O/P NEW MOD 45 MIN: CPT | Performed by: SURGERY

## 2024-04-17 ASSESSMENT — PAIN SCALES - GENERAL: PAINLEVEL: NO PAIN (0)

## 2024-04-17 NOTE — TELEPHONE ENCOUNTER
Called to schedule surgery- she would like 5/2, but [facility] does not have enough block time open.... I will call [facility] OR tomorrow morning and see if they can accommodate.  I will call patient back tomorrow after 9:00 AM

## 2024-04-17 NOTE — PROGRESS NOTES
Assessment & Plan   Problem List Items Addressed This Visit          Behavioral    Tobacco abuse - Primary     Other Visit Diagnoses       Symptomatic cholelithiasis        Relevant Orders    Case Request: CHOLECYSTECTOMY, ROBOT-ASSISTED, LAPAROSCOPIC, USING DA NESSA XI (Completed)           30 yo F with intermittent biliary colic 2/2 chronic calculus cholecystitis  The patient was thoroughly counseled regarding their Symptomatic cholelithiasis [K80.20].     The patient was informed that the proposed procedure or medical intervention involves removal of the gallbladder laparoscopically or robotically (with small incisions and camera) possibly open and does offer a very good likelihood of symptom relief.  I recommend robotic approach.  I also would want her to want on 5/2/2024 or after to make it a full 6 weeks post partum before undergoing another major surgery.     The patient was made aware of the risks of the procedure, including but not limited to:    bleeding, conversion to open, bile leak, bile duct injury or other adjacent organ injury, retained gallstones, cardiac or pulmonary related complications and anesthesia complications. Also that difficulties may be encountered during recovery to include: wound or deep intraabdominal infection, wound dehiscence, incisional hernia, bile leak or retained stone requiring ERCP.     In the course of the evaluation we did discuss other therapeutic options with the patient, including antibiotics and/or drainage. The risks and benefits of these options were also discussed which include but are not limited to: recurrent worsening episodes.     Also discussed were possible problems or difficulties the patient may encounter if treatment was not pursued at this time. These include: worsening episodes, cholangitis and/or pancreatitis.     The patient was informed that [facility], DO will be primarily responsible for the procedure. Assistance during the procedure and during  "hospitalization may also be provided by other physicians, nurses and technicians.     The patient was also informed that if exposure to the patient s blood or body fluids occurs during the procedure, HIV testing of the patient will occur unless they refuse at this time. Risk of blood transfusion is minimal.     The patient will be provided additional education resources by the support staff. If there are ever any questions regarding their diagnosis or the procedure, the patient is encouraged to ask.     All of the patient s or their legal representative s questions have been answered to their satisfaction and they have indicated a clear understanding of this discussion.   Marian expressed understanding of risks, benefits and alternatives and wished to proceed.     All findings, test results, and diagnosis were discussed with the patient. Marian  participated in the decision making process and agreed with the plan of care. Questions were sought and answered.     Face to Face/patient Contact total time: 25 minutes  Pre Charting time: 10 minutes; Post charting time, communication and other activities: 10 minutes;   Total time:  45 minutes       BMI  Estimated body mass index is 28.14 kg/m  as calculated from the following:    Height as of this encounter: 1.651 m (5' 5\").    Weight as of this encounter: 76.7 kg (169 lb 1.5 oz).       No follow-ups on file.      Jn Fonseca is a 29 year old, presenting for the following health issues:  Consult (Gallbladder)    Had vaginal delivery on March 23; uneventful   Pt was a surrogate  Had a few biliary colic attacks during her pregnancy; but last week had similar pain and went to ED as pain did not improved after several hrs.    Noted that flare up usually with red meat; pain consistently at the RUQ; radiates into the back; associated nausea and something vomiting.   No fevers; no chills  Been staying away from red meats and fatty foods since ED and has been symptom " "free  History of tubal ligation laparoscopically.   Not on blood thinner; never CVA; no MI.              Review of Systems  Constitutional, neuro, ENT, endocrine, pulmonary, cardiac, gastrointestinal, genitourinary, musculoskeletal, integument and psychiatric systems are negative, except as otherwise noted.      Objective    /71 (BP Location: Right arm, Patient Position: Sitting, Cuff Size: Adult Regular)   Pulse 60   Temp 97.5  F (36.4  C) (Tympanic)   Ht 1.651 m (5' 5\")   Wt 76.7 kg (169 lb 1.5 oz)   BMI 28.14 kg/m    Body mass index is 28.14 kg/m .  Physical Exam  Vitals reviewed.   Eyes:      Conjunctiva/sclera: Conjunctivae normal.   Cardiovascular:      Pulses: Normal pulses.   Pulmonary:      Effort: Pulmonary effort is normal.   Abdominal:      General: There is no distension.      Palpations: There is no mass.      Tenderness: There is no abdominal tenderness. There is no guarding.   Skin:     General: Skin is warm.      Capillary Refill: Capillary refill takes less than 2 seconds.   Neurological:      General: No focal deficit present.      Mental Status: She is alert.   Psychiatric:         Mood and Affect: Mood normal.        [facility] Procedure Note      Limited Bedside ED Gallbladder  Ultrasound:     PROCEDURE: PERFORMED BY: Dr. Lui Mcnulty MD   INDICATIONS:  RUQ/Epigastric Pain   PROBE:  Low frequency convex probe   BODY LOCATION: Abdomen   FINDINGS:   An ultrasound of the gallbladder was performed using longitudinal and transverse views.   Gallstone(s):  Present   Gallbladder sludge:  Absent   Sonographic Pena's sign:  Absent   Gallbladder wall thickening (greater than 4 mm):  Absent   Pericholecystic fluid: Absent   Common bile duct (dilated if internal diameter greater than 6 mm): normal   INTERPRETATION: Cholelithiasis without signs of cholecystitis   IMAGE DOCUMENTATION: Images were archived to PACs system.         Signed Electronically by: Lorne [facility] MD    "

## 2024-04-17 NOTE — NURSING NOTE
"Initial /71 (BP Location: Right arm, Patient Position: Sitting, Cuff Size: Adult Regular)   Pulse 60   Temp 97.5  F (36.4  C) (Tympanic)   Ht 1.651 m (5' 5\")   Wt 76.7 kg (169 lb 1.5 oz)   BMI 28.14 kg/m   Estimated body mass index is 28.14 kg/m  as calculated from the following:    Height as of this encounter: 1.651 m (5' 5\").    Weight as of this encounter: 76.7 kg (169 lb 1.5 oz). .  Yulia Ragland MA    "

## 2024-04-17 NOTE — LETTER
4/17/2024         RE: Marian Sanon  [address] [address] [place] [id number]        Dear Colleague,    Thank you for referring your patient, Marian Sanon, to the [facility]. Please see a copy of my visit note below.      Assessment & Plan  Problem List Items Addressed This Visit          Behavioral    Tobacco abuse - Primary     Other Visit Diagnoses       Symptomatic cholelithiasis        Relevant Orders    Case Request: CHOLECYSTECTOMY, ROBOT-ASSISTED, LAPAROSCOPIC, USING DA NESSA XI (Completed)           30 yo F with intermittent biliary colic 2/2 chronic calculus cholecystitis  The patient was thoroughly counseled regarding their Symptomatic cholelithiasis [K80.20].     The patient was informed that the proposed procedure or medical intervention involves removal of the gallbladder laparoscopically or robotically (with small incisions and camera) possibly open and does offer a very good likelihood of symptom relief.  I recommend robotic approach.  I also would want her to want on 5/2/2024 or after to make it a full 6 weeks post partum before undergoing another major surgery.     The patient was made aware of the risks of the procedure, including but not limited to:    bleeding, conversion to open, bile leak, bile duct injury or other adjacent organ injury, retained gallstones, cardiac or pulmonary related complications and anesthesia complications. Also that difficulties may be encountered during recovery to include: wound or deep intraabdominal infection, wound dehiscence, incisional hernia, bile leak or retained stone requiring ERCP.     In the course of the evaluation we did discuss other therapeutic options with the patient, including antibiotics and/or drainage. The risks and benefits of these options were also discussed which include but are not limited to: recurrent worsening episodes.     Also discussed were possible problems or difficulties the patient may encounter if  "treatment was not pursued at this time. These include: worsening episodes, cholangitis and/or pancreatitis.     The patient was informed that [facility], DO will be primarily responsible for the procedure. Assistance during the procedure and during hospitalization may also be provided by other physicians, nurses and technicians.     The patient was also informed that if exposure to the patient s blood or body fluids occurs during the procedure, HIV testing of the patient will occur unless they refuse at this time. Risk of blood transfusion is minimal.     The patient will be provided additional education resources by the support staff. If there are ever any questions regarding their diagnosis or the procedure, the patient is encouraged to ask.     All of the patient s or their legal representative s questions have been answered to their satisfaction and they have indicated a clear understanding of this discussion.   Marian expressed understanding of risks, benefits and alternatives and wished to proceed.     All findings, test results, and diagnosis were discussed with the patient. Marian  participated in the decision making process and agreed with the plan of care. Questions were sought and answered.     Face to Face/patient Contact total time: 25 minutes  Pre Charting time: 10 minutes; Post charting time, communication and other activities: 10 minutes;   Total time:  45 minutes       BMI  Estimated body mass index is 28.14 kg/m  as calculated from the following:    Height as of this encounter: 1.651 m (5' 5\").    Weight as of this encounter: 76.7 kg (169 lb 1.5 oz).       No follow-ups on file.      Jn Fonseca is a 29 year old, presenting for the following health issues:  Consult (Gallbladder)    Had vaginal delivery on March 23; uneventful   Pt was a surrogate  Had a few biliary colic attacks during her pregnancy; but last week had similar pain and went to ED as pain did not improved after several hrs.  " "  Noted that flare up usually with red meat; pain consistently at the RUQ; radiates into the back; associated nausea and something vomiting.   No fevers; no chills  Been staying away from red meats and fatty foods since ED and has been symptom free  History of tubal ligation laparoscopically.   Not on blood thinner; never CVA; no MI.              Review of Systems  Constitutional, neuro, ENT, endocrine, pulmonary, cardiac, gastrointestinal, genitourinary, musculoskeletal, integument and psychiatric systems are negative, except as otherwise noted.      Objective   /71 (BP Location: Right arm, Patient Position: Sitting, Cuff Size: Adult Regular)   Pulse 60   Temp 97.5  F (36.4  C) (Tympanic)   Ht 1.651 m (5' 5\")   Wt 76.7 kg (169 lb 1.5 oz)   BMI 28.14 kg/m    Body mass index is 28.14 kg/m .  Physical Exam  Vitals reviewed.   Eyes:      Conjunctiva/sclera: Conjunctivae normal.   Cardiovascular:      Pulses: Normal pulses.   Pulmonary:      Effort: Pulmonary effort is normal.   Abdominal:      General: There is no distension.      Palpations: There is no mass.      Tenderness: There is no abdominal tenderness. There is no guarding.   Skin:     General: Skin is warm.      Capillary Refill: Capillary refill takes less than 2 seconds.   Neurological:      General: No focal deficit present.      Mental Status: She is alert.   Psychiatric:         Mood and Affect: Mood normal.        [facility] Procedure Note      Limited Bedside ED Gallbladder  Ultrasound:     PROCEDURE: PERFORMED BY: Dr. Lui Mcnulty MD   INDICATIONS:  RUQ/Epigastric Pain   PROBE:  Low frequency convex probe   BODY LOCATION: Abdomen   FINDINGS:   An ultrasound of the gallbladder was performed using longitudinal and transverse views.   Gallstone(s):  Present   Gallbladder sludge:  Absent   Sonographic Pena's sign:  Absent   Gallbladder wall thickening (greater than 4 mm):  Absent   Pericholecystic fluid: Absent   Common bile " duct (dilated if internal diameter greater than 6 mm): normal   INTERPRETATION: Cholelithiasis without signs of cholecystitis   IMAGE DOCUMENTATION: Images were archived to PACs system.         Signed Electronically by: Lorne [facility] MD        Again, thank you for allowing me to participate in the care of your patient.        Sincerely,        Lorne [facility] MD

## 2024-04-18 NOTE — TELEPHONE ENCOUNTER
Type of surgery: CHOLECYSTECTOMY, ROBOT-ASSISTED, LAPAROSCOPIC, USING DA NESSA XI   Location of surgery: [facility] OR  Date and time of surgery: 5/2  Surgeon: Aby  Pre-Op Appt Date: Carlos Todd  Post-Op Appt Date: 5/14   Packet sent out: Yes  Pre-cert/Authorization completed:  Not Applicable  Date: na

## 2024-04-29 ENCOUNTER — ANESTHESIA EVENT (OUTPATIENT)
Dept: SURGERY | Facility: CLINIC | Age: 29
End: 2024-04-29
Payer: COMMERCIAL

## 2024-04-29 ASSESSMENT — LIFESTYLE VARIABLES: TOBACCO_USE: 1

## 2024-04-29 NOTE — ANESTHESIA PREPROCEDURE EVALUATION
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     TONSILLECTOMY & ADENOIDECTOMY        No Known Allergies   Social History     Tobacco Use    Smoking status: Former    Smokeless tobacco: Never    Tobacco comments:     3 cigarettes per day- quit with pregnancy   Substance Use Topics    Alcohol use: Yes     Comment: occas- quit with pregnancy      Wt Readings from Last 1 Encounters:   24 76.7 kg (169 lb 1.5 oz)        Anesthesia Evaluation   Pt has had prior anesthetic. Type: General.        ROS/MED HX  ENT/Pulmonary:     (+)                tobacco use, Past use,                       Neurologic:       Cardiovascular:       METS/Exercise Tolerance:     Hematologic:     (+)      anemia,          Musculoskeletal:       GI/Hepatic:       Renal/Genitourinary:       Endo:       Psychiatric/Substance Use:     (+) psychiatric history (panic attacks) anxiety and depression   Recreational drug usage: Cannabis.    Infectious Disease:       Malignancy:       Other:            Physical Exam    Airway        Mallampati: II   TM distance: > 3 FB   Neck ROM: full   Mouth opening: > 3 cm    Respiratory Devices and Support  Comment: Not on oxygen currently       Dental  no notable dental history         Cardiovascular   cardiovascular exam normal          Pulmonary   pulmonary exam normal                OUTSIDE LABS:  CBC:   Lab Results   Component Value Date    WBC 6.5  "04/11/2024    WBC 10.2 08/14/2023    HGB 13.3 04/11/2024    HGB 11.9 08/14/2023    HCT 40.5 04/11/2024    HCT 35.8 08/14/2023     04/11/2024     08/14/2023     BMP:   Lab Results   Component Value Date     04/11/2024    POTASSIUM 4.2 04/11/2024    CHLORIDE 102 04/11/2024    CO2 25 04/11/2024    BUN 15.2 04/11/2024    CR 0.72 04/11/2024    GLC 96 04/11/2024     COAGS: No results found for: \"PTT\", \"INR\", \"FIBR\"  POC:   Lab Results   Component Value Date    HCG Positive (A) 06/17/2014     HEPATIC:   Lab Results   Component Value Date    ALBUMIN 4.3 04/11/2024    PROTTOTAL 7.5 04/11/2024    ALT 35 04/11/2024    AST 31 04/11/2024    ALKPHOS 132 04/11/2024    BILITOTAL 0.2 04/11/2024     OTHER:   Lab Results   Component Value Date    AMIRAH 9.7 04/11/2024    LIPASE 99 (H) 04/11/2024       Anesthesia Plan    ASA Status:  2       Anesthesia Type: General.     - Airway: ETT   Induction: Intravenous, Propofol.   Maintenance: TIVA.        Consents    Anesthesia Plan(s) and associated risks, benefits, and realistic alternatives discussed. Questions answered and patient/representative(s) expressed understanding.     - Discussed: Risks, Benefits and Alternatives for BOTH SEDATION and the PROCEDURE were discussed     - Discussed with:  Patient            Postoperative Care    Pain management: IV analgesics, Oral pain medications, Multi-modal analgesia.   PONV prophylaxis: Ondansetron (or other 5HT-3), Dexamethasone or Solumedrol     Comments:    Other Comments: Patient aware of plan, what to expect, and potential risks. Timeout was performed before bringing the patient back to OR, and once again, patient was asked if they had any questions           VAISHALI Lee CRNA    I have reviewed the pertinent notes and labs in the chart from the past 30 days and (re)examined the patient.  Any updates or changes from those notes are reflected in this note.              # Overweight: Estimated body mass index is " "28.14 kg/m  as calculated from the following:    Height as of 4/17/24: 1.651 m (5' 5\").    Weight as of 4/17/24: 76.7 kg (169 lb 1.5 oz).      "

## 2024-05-02 ENCOUNTER — HOSPITAL ENCOUNTER (OUTPATIENT)
Facility: CLINIC | Age: 29
Discharge: HOME | End: 2024-05-02
Attending: SURGERY | Admitting: SURGERY
Payer: COMMERCIAL

## 2024-05-02 ENCOUNTER — ANESTHESIA (OUTPATIENT)
Dept: SURGERY | Facility: CLINIC | Age: 29
End: 2024-05-02
Payer: COMMERCIAL

## 2024-05-02 VITALS
RESPIRATION RATE: 11 BRPM | HEIGHT: 65 IN | TEMPERATURE: 98.1 F | BODY MASS INDEX: 28.16 KG/M2 | SYSTOLIC BLOOD PRESSURE: 116 MMHG | WEIGHT: 169 LBS | HEART RATE: 67 BPM | OXYGEN SATURATION: 94 % | DIASTOLIC BLOOD PRESSURE: 80 MMHG

## 2024-05-02 DIAGNOSIS — Z90.49 S/P CHOLECYSTECTOMY: Primary | ICD-10-CM

## 2024-05-02 PROCEDURE — S2900 ROBOTIC SURGICAL SYSTEM: HCPCS | Performed by: SURGERY

## 2024-05-02 PROCEDURE — 258N000003 HC RX IP 258 OP 636: Performed by: NURSE ANESTHETIST, CERTIFIED REGISTERED

## 2024-05-02 PROCEDURE — 271N000001 HC OR GENERAL SUPPLY NON-STERILE: Performed by: SURGERY

## 2024-05-02 PROCEDURE — 250N000009 HC RX 250: Performed by: SURGERY

## 2024-05-02 PROCEDURE — 999N000141 HC STATISTIC PRE-PROCEDURE NURSING ASSESSMENT: Performed by: SURGERY

## 2024-05-02 PROCEDURE — 250N000011 HC RX IP 250 OP 636: Performed by: NURSE ANESTHETIST, CERTIFIED REGISTERED

## 2024-05-02 PROCEDURE — 88304 TISSUE EXAM BY PATHOLOGIST: CPT | Mod: TC | Performed by: SURGERY

## 2024-05-02 PROCEDURE — 250N000011 HC RX IP 250 OP 636: Performed by: SURGERY

## 2024-05-02 PROCEDURE — 710N000012 HC RECOVERY PHASE 2, PER MINUTE: Performed by: SURGERY

## 2024-05-02 PROCEDURE — 250N000009 HC RX 250

## 2024-05-02 PROCEDURE — 250N000009 HC RX 250: Performed by: NURSE ANESTHETIST, CERTIFIED REGISTERED

## 2024-05-02 PROCEDURE — 250N000013 HC RX MED GY IP 250 OP 250 PS 637: Performed by: NURSE ANESTHETIST, CERTIFIED REGISTERED

## 2024-05-02 PROCEDURE — 272N000001 HC OR GENERAL SUPPLY STERILE: Performed by: SURGERY

## 2024-05-02 PROCEDURE — 47562 LAPAROSCOPIC CHOLECYSTECTOMY: CPT | Mod: AS | Performed by: PHYSICIAN ASSISTANT

## 2024-05-02 PROCEDURE — 710N000009 HC RECOVERY PHASE 1, LEVEL 1, PER MIN: Performed by: SURGERY

## 2024-05-02 PROCEDURE — 250N000011 HC RX IP 250 OP 636

## 2024-05-02 PROCEDURE — 370N000017 HC ANESTHESIA TECHNICAL FEE, PER MIN: Performed by: SURGERY

## 2024-05-02 PROCEDURE — 360N000080 HC SURGERY LEVEL 7, PER MIN: Performed by: SURGERY

## 2024-05-02 PROCEDURE — 88304 TISSUE EXAM BY PATHOLOGIST: CPT | Mod: 26 | Performed by: PATHOLOGY

## 2024-05-02 PROCEDURE — 47562 LAPAROSCOPIC CHOLECYSTECTOMY: CPT | Performed by: SURGERY

## 2024-05-02 RX ORDER — HYDROMORPHONE HCL IN WATER/PF 6 MG/30 ML
0.2 PATIENT CONTROLLED ANALGESIA SYRINGE INTRAVENOUS EVERY 5 MIN PRN
Status: DISCONTINUED | OUTPATIENT
Start: 2024-05-02 | End: 2024-05-02 | Stop reason: HOSPADM

## 2024-05-02 RX ORDER — CEFAZOLIN SODIUM/WATER 2 G/20 ML
2 SYRINGE (ML) INTRAVENOUS
Status: COMPLETED | OUTPATIENT
Start: 2024-05-02 | End: 2024-05-02

## 2024-05-02 RX ORDER — ONDANSETRON 4 MG/1
4 TABLET, ORALLY DISINTEGRATING ORAL EVERY 30 MIN PRN
Status: DISCONTINUED | OUTPATIENT
Start: 2024-05-02 | End: 2024-05-02

## 2024-05-02 RX ORDER — ACETAMINOPHEN 325 MG/1
975 TABLET ORAL ONCE
Status: COMPLETED | OUTPATIENT
Start: 2024-05-02 | End: 2024-05-02

## 2024-05-02 RX ORDER — SODIUM CHLORIDE, SODIUM LACTATE, POTASSIUM CHLORIDE, CALCIUM CHLORIDE 600; 310; 30; 20 MG/100ML; MG/100ML; MG/100ML; MG/100ML
INJECTION, SOLUTION INTRAVENOUS CONTINUOUS
Status: DISCONTINUED | OUTPATIENT
Start: 2024-05-02 | End: 2024-05-02 | Stop reason: HOSPADM

## 2024-05-02 RX ORDER — ONDANSETRON 2 MG/ML
4 INJECTION INTRAMUSCULAR; INTRAVENOUS EVERY 30 MIN PRN
Status: DISCONTINUED | OUTPATIENT
Start: 2024-05-02 | End: 2024-05-02 | Stop reason: HOSPADM

## 2024-05-02 RX ORDER — DEXAMETHASONE SODIUM PHOSPHATE 4 MG/ML
4 INJECTION, SOLUTION INTRA-ARTICULAR; INTRALESIONAL; INTRAMUSCULAR; INTRAVENOUS; SOFT TISSUE
Status: DISCONTINUED | OUTPATIENT
Start: 2024-05-02 | End: 2024-05-02

## 2024-05-02 RX ORDER — ONDANSETRON 2 MG/ML
INJECTION INTRAMUSCULAR; INTRAVENOUS PRN
Status: DISCONTINUED | OUTPATIENT
Start: 2024-05-02 | End: 2024-05-02

## 2024-05-02 RX ORDER — OXYCODONE HYDROCHLORIDE 5 MG/1
5 TABLET ORAL EVERY 6 HOURS PRN
Qty: 12 TABLET | Refills: 0 | Status: SHIPPED | OUTPATIENT
Start: 2024-05-02 | End: 2024-05-23

## 2024-05-02 RX ORDER — PROPOFOL 10 MG/ML
INJECTION, EMULSION INTRAVENOUS CONTINUOUS PRN
Status: DISCONTINUED | OUTPATIENT
Start: 2024-05-02 | End: 2024-05-02

## 2024-05-02 RX ORDER — DEXAMETHASONE SODIUM PHOSPHATE 4 MG/ML
4 INJECTION, SOLUTION INTRA-ARTICULAR; INTRALESIONAL; INTRAMUSCULAR; INTRAVENOUS; SOFT TISSUE
Status: DISCONTINUED | OUTPATIENT
Start: 2024-05-02 | End: 2024-05-02 | Stop reason: HOSPADM

## 2024-05-02 RX ORDER — ONDANSETRON 4 MG/1
4 TABLET, ORALLY DISINTEGRATING ORAL EVERY 30 MIN PRN
Status: DISCONTINUED | OUTPATIENT
Start: 2024-05-02 | End: 2024-05-02 | Stop reason: HOSPADM

## 2024-05-02 RX ORDER — ONDANSETRON 2 MG/ML
4 INJECTION INTRAMUSCULAR; INTRAVENOUS EVERY 30 MIN PRN
Status: DISCONTINUED | OUTPATIENT
Start: 2024-05-02 | End: 2024-05-02

## 2024-05-02 RX ORDER — LIDOCAINE 40 MG/G
CREAM TOPICAL
Status: DISCONTINUED | OUTPATIENT
Start: 2024-05-02 | End: 2024-05-02 | Stop reason: HOSPADM

## 2024-05-02 RX ORDER — OXYCODONE HYDROCHLORIDE 5 MG/1
5 TABLET ORAL
Status: DISCONTINUED | OUTPATIENT
Start: 2024-05-02 | End: 2024-05-02

## 2024-05-02 RX ORDER — OXYCODONE HYDROCHLORIDE 5 MG/1
5 TABLET ORAL
Status: DISCONTINUED | OUTPATIENT
Start: 2024-05-02 | End: 2024-05-02 | Stop reason: HOSPADM

## 2024-05-02 RX ORDER — HYDROMORPHONE HCL IN WATER/PF 6 MG/30 ML
0.4 PATIENT CONTROLLED ANALGESIA SYRINGE INTRAVENOUS EVERY 5 MIN PRN
Status: DISCONTINUED | OUTPATIENT
Start: 2024-05-02 | End: 2024-05-02 | Stop reason: HOSPADM

## 2024-05-02 RX ORDER — DEXAMETHASONE SODIUM PHOSPHATE 4 MG/ML
INJECTION, SOLUTION INTRA-ARTICULAR; INTRALESIONAL; INTRAMUSCULAR; INTRAVENOUS; SOFT TISSUE PRN
Status: DISCONTINUED | OUTPATIENT
Start: 2024-05-02 | End: 2024-05-02

## 2024-05-02 RX ORDER — MAGNESIUM SULFATE HEPTAHYDRATE 40 MG/ML
INJECTION, SOLUTION INTRAVENOUS PRN
Status: DISCONTINUED | OUTPATIENT
Start: 2024-05-02 | End: 2024-05-02

## 2024-05-02 RX ORDER — ACETAMINOPHEN 325 MG/1
650 TABLET ORAL
Status: DISCONTINUED | OUTPATIENT
Start: 2024-05-02 | End: 2024-05-02 | Stop reason: HOSPADM

## 2024-05-02 RX ORDER — KETOROLAC TROMETHAMINE 30 MG/ML
INJECTION, SOLUTION INTRAMUSCULAR; INTRAVENOUS PRN
Status: DISCONTINUED | OUTPATIENT
Start: 2024-05-02 | End: 2024-05-02

## 2024-05-02 RX ORDER — CEFAZOLIN SODIUM/WATER 2 G/20 ML
2 SYRINGE (ML) INTRAVENOUS SEE ADMIN INSTRUCTIONS
Status: DISCONTINUED | OUTPATIENT
Start: 2024-05-02 | End: 2024-05-02 | Stop reason: HOSPADM

## 2024-05-02 RX ORDER — NALOXONE HYDROCHLORIDE 0.4 MG/ML
0.1 INJECTION, SOLUTION INTRAMUSCULAR; INTRAVENOUS; SUBCUTANEOUS
Status: DISCONTINUED | OUTPATIENT
Start: 2024-05-02 | End: 2024-05-02 | Stop reason: HOSPADM

## 2024-05-02 RX ORDER — METHOCARBAMOL 500 MG/1
500 TABLET, FILM COATED ORAL
Status: DISCONTINUED | OUTPATIENT
Start: 2024-05-02 | End: 2024-05-02 | Stop reason: HOSPADM

## 2024-05-02 RX ORDER — KETAMINE HYDROCHLORIDE 10 MG/ML
INJECTION INTRAMUSCULAR; INTRAVENOUS PRN
Status: DISCONTINUED | OUTPATIENT
Start: 2024-05-02 | End: 2024-05-02

## 2024-05-02 RX ORDER — INDOCYANINE GREEN AND WATER 25 MG
2.5 KIT INJECTION ONCE
Status: COMPLETED | OUTPATIENT
Start: 2024-05-02 | End: 2024-05-02

## 2024-05-02 RX ORDER — FENTANYL CITRATE 50 UG/ML
25 INJECTION, SOLUTION INTRAMUSCULAR; INTRAVENOUS EVERY 5 MIN PRN
Status: DISCONTINUED | OUTPATIENT
Start: 2024-05-02 | End: 2024-05-02 | Stop reason: HOSPADM

## 2024-05-02 RX ORDER — NALOXONE HYDROCHLORIDE 0.4 MG/ML
0.1 INJECTION, SOLUTION INTRAMUSCULAR; INTRAVENOUS; SUBCUTANEOUS
Status: DISCONTINUED | OUTPATIENT
Start: 2024-05-02 | End: 2024-05-02

## 2024-05-02 RX ORDER — LIDOCAINE HYDROCHLORIDE 20 MG/ML
INJECTION, SOLUTION INFILTRATION; PERINEURAL PRN
Status: DISCONTINUED | OUTPATIENT
Start: 2024-05-02 | End: 2024-05-02

## 2024-05-02 RX ORDER — FENTANYL CITRATE 50 UG/ML
INJECTION, SOLUTION INTRAMUSCULAR; INTRAVENOUS PRN
Status: DISCONTINUED | OUTPATIENT
Start: 2024-05-02 | End: 2024-05-02

## 2024-05-02 RX ORDER — LIDOCAINE HYDROCHLORIDE AND EPINEPHRINE 10; 10 MG/ML; UG/ML
INJECTION, SOLUTION INFILTRATION; PERINEURAL PRN
Status: DISCONTINUED | OUTPATIENT
Start: 2024-05-02 | End: 2024-05-02 | Stop reason: HOSPADM

## 2024-05-02 RX ORDER — MEPERIDINE HYDROCHLORIDE 25 MG/ML
12.5 INJECTION INTRAMUSCULAR; INTRAVENOUS; SUBCUTANEOUS
Status: DISCONTINUED | OUTPATIENT
Start: 2024-05-02 | End: 2024-05-02 | Stop reason: HOSPADM

## 2024-05-02 RX ORDER — PROPOFOL 10 MG/ML
INJECTION, EMULSION INTRAVENOUS PRN
Status: DISCONTINUED | OUTPATIENT
Start: 2024-05-02 | End: 2024-05-02

## 2024-05-02 RX ORDER — FENTANYL CITRATE 50 UG/ML
50 INJECTION, SOLUTION INTRAMUSCULAR; INTRAVENOUS EVERY 5 MIN PRN
Status: DISCONTINUED | OUTPATIENT
Start: 2024-05-02 | End: 2024-05-02 | Stop reason: HOSPADM

## 2024-05-02 RX ORDER — GABAPENTIN 100 MG/1
200 CAPSULE ORAL
Status: COMPLETED | OUTPATIENT
Start: 2024-05-02 | End: 2024-05-02

## 2024-05-02 RX ORDER — OXYCODONE HYDROCHLORIDE 5 MG/1
10 TABLET ORAL
Status: DISCONTINUED | OUTPATIENT
Start: 2024-05-02 | End: 2024-05-02 | Stop reason: HOSPADM

## 2024-05-02 RX ORDER — FENTANYL CITRATE 50 UG/ML
25 INJECTION, SOLUTION INTRAMUSCULAR; INTRAVENOUS
Status: DISCONTINUED | OUTPATIENT
Start: 2024-05-02 | End: 2024-05-02 | Stop reason: HOSPADM

## 2024-05-02 RX ORDER — HEPARIN SODIUM 5000 [USP'U]/.5ML
5000 INJECTION, SOLUTION INTRAVENOUS; SUBCUTANEOUS
Status: COMPLETED | OUTPATIENT
Start: 2024-05-02 | End: 2024-05-02

## 2024-05-02 RX ADMIN — LIDOCAINE HYDROCHLORIDE 0.2 ML: 10 INJECTION, SOLUTION EPIDURAL; INFILTRATION; INTRACAUDAL; PERINEURAL at 12:36

## 2024-05-02 RX ADMIN — MIDAZOLAM 2 MG: 1 INJECTION INTRAMUSCULAR; INTRAVENOUS at 14:29

## 2024-05-02 RX ADMIN — ONDANSETRON 4 MG: 2 INJECTION INTRAMUSCULAR; INTRAVENOUS at 14:33

## 2024-05-02 RX ADMIN — Medication 2.5 MG: at 12:33

## 2024-05-02 RX ADMIN — HYDROMORPHONE HYDROCHLORIDE 0.4 MG: 0.2 INJECTION, SOLUTION INTRAMUSCULAR; INTRAVENOUS; SUBCUTANEOUS at 17:00

## 2024-05-02 RX ADMIN — FENTANYL CITRATE 50 MCG: 50 INJECTION INTRAMUSCULAR; INTRAVENOUS at 16:39

## 2024-05-02 RX ADMIN — PROPOFOL 200 MG: 10 INJECTION, EMULSION INTRAVENOUS at 14:33

## 2024-05-02 RX ADMIN — PROPOFOL 200 MCG/KG/MIN: 10 INJECTION, EMULSION INTRAVENOUS at 14:33

## 2024-05-02 RX ADMIN — HEPARIN SODIUM 5000 UNITS: 10000 INJECTION, SOLUTION INTRAVENOUS; SUBCUTANEOUS at 14:39

## 2024-05-02 RX ADMIN — KETAMINE HYDROCHLORIDE 50 MG: 10 INJECTION INTRAMUSCULAR; INTRAVENOUS at 14:33

## 2024-05-02 RX ADMIN — KETOROLAC TROMETHAMINE 30 MG: 30 INJECTION, SOLUTION INTRAMUSCULAR at 16:30

## 2024-05-02 RX ADMIN — LIDOCAINE HYDROCHLORIDE 100 MG: 20 INJECTION, SOLUTION INFILTRATION; PERINEURAL at 14:33

## 2024-05-02 RX ADMIN — GABAPENTIN 200 MG: 100 CAPSULE ORAL at 12:30

## 2024-05-02 RX ADMIN — SODIUM CHLORIDE, POTASSIUM CHLORIDE, SODIUM LACTATE AND CALCIUM CHLORIDE: 600; 310; 30; 20 INJECTION, SOLUTION INTRAVENOUS at 12:34

## 2024-05-02 RX ADMIN — SUGAMMADEX 200 MG: 100 INJECTION, SOLUTION INTRAVENOUS at 16:15

## 2024-05-02 RX ADMIN — ROCURONIUM BROMIDE 20 MG: 50 INJECTION, SOLUTION INTRAVENOUS at 15:08

## 2024-05-02 RX ADMIN — DEXAMETHASONE SODIUM PHOSPHATE 4 MG: 4 INJECTION, SOLUTION INTRA-ARTICULAR; INTRALESIONAL; INTRAMUSCULAR; INTRAVENOUS; SOFT TISSUE at 14:33

## 2024-05-02 RX ADMIN — ROCURONIUM BROMIDE 50 MG: 50 INJECTION, SOLUTION INTRAVENOUS at 14:33

## 2024-05-02 RX ADMIN — FENTANYL CITRATE 50 MCG: 50 INJECTION INTRAMUSCULAR; INTRAVENOUS at 15:09

## 2024-05-02 RX ADMIN — MAGNESIUM SULFATE HEPTAHYDRATE 2 G: 40 INJECTION, SOLUTION INTRAVENOUS at 15:30

## 2024-05-02 RX ADMIN — FENTANYL CITRATE 50 MCG: 50 INJECTION INTRAMUSCULAR; INTRAVENOUS at 15:20

## 2024-05-02 RX ADMIN — FENTANYL CITRATE 100 MCG: 50 INJECTION INTRAMUSCULAR; INTRAVENOUS at 14:33

## 2024-05-02 RX ADMIN — Medication 2 G: at 14:24

## 2024-05-02 RX ADMIN — ACETAMINOPHEN 975 MG: 325 TABLET, FILM COATED ORAL at 12:30

## 2024-05-02 RX ADMIN — FENTANYL CITRATE 50 MCG: 50 INJECTION INTRAMUSCULAR; INTRAVENOUS at 16:49

## 2024-05-02 ASSESSMENT — ACTIVITIES OF DAILY LIVING (ADL)
ADLS_ACUITY_SCORE: 18
ADLS_ACUITY_SCORE: 16
ADLS_ACUITY_SCORE: 18

## 2024-05-02 NOTE — ANESTHESIA CARE TRANSFER NOTE
Patient: Marian Sanon    Procedure: Procedure(s):  CHOLECYSTECTOMY, ROBOT-ASSISTED, LAPAROSCOPIC, USING DA NESSA XI       Diagnosis: Symptomatic cholelithiasis [K80.20]  Diagnosis Additional Information: No value filed.    Anesthesia Type:   No value filed.     Note:    Oropharynx: oropharynx clear of all foreign objects  Level of Consciousness: awake and drowsy      Independent Airway: airway patency satisfactory and stable  Dentition: dentition unchanged  Vital Signs Stable: post-procedure vital signs reviewed and stable  Report to RN Given: handoff report given  Patient transferred to: PACU    Handoff Report: Identifed the Patient, Identified the Reponsible Provider, Reviewed the pertinent medical history, Discussed the surgical course, Reviewed Intra-OP anesthesia mangement and issues during anesthesia, Set expectations for post-procedure period and Allowed opportunity for questions and acknowledgement of understanding      Vitals:  Vitals Value Taken Time   /60 05/02/24 1630   Temp 36.5  C (97.7  F) 05/02/24 1641   Pulse 82 05/02/24 1641   Resp 13 05/02/24 1641   SpO2 95 % 05/02/24 1641   Vitals shown include unfiled device data.    Electronically Signed By: VAISHALI Rasheed CRNA  May 2, 2024  4:42 PM

## 2024-05-02 NOTE — OP NOTE
[facility]  Operative Note    Pre-operative diagnosis: Symptomatic cholelithiasis [K80.20]   Post-operative diagnosis same   Procedure: Procedure(s):  CHOLECYSTECTOMY, ROBOT-ASSISTED, LAPAROSCOPIC, USING DA NESSA XI  ICG  cholangiography   Surgeon: Lorne Badillo MD   Assistants(s): Yonathan Chaudhari PA-C assistance was required for port placement, bedside robotic instrument exchanges, bedside needs for retraction and suction as needed, introduction/removal of sutures or mesh, and closure  Manuel Peterson, R2 - visiting resident   Anesthesia: General    Estimated blood loss: Minimal                Drains: None     Specimens       ID Type Source Tests Collected by Time Destination   1 :  Tissue Gallbladder SURGICAL PATHOLOGY EXAM Lorne Badillo MD 5/2/2024  3:32 PM       Implants: None   Findings: Chronic calculus cholecystitis .   Complications: None.   Condition: Stable       Indications for the procedure:   This is a 29-year-old F presented with biliary colic symptoms.  Gallbladder ultrasound showed sludge and stones .  No signs of acute cholecystitis thus was recommended that patient undergo a elective robotic cholecystectomy.  Risks, benefits, alternatives were explained to the patient.  She showed understanding wish to proceed.     Description of procedure:  Patient was probably identified in the preop holding area.  Patient was then brought back to the operative suite.  Placed in a supine position.  Anesthesia was induced per anesthesia protocol.  Both arms were tucked.  Patient was then prepped and draped in usual sterile fashion.  A timeout was then done to confirm the correct patient positioning and procedure.  Started the procedure local anesthetic was infiltrated at Hill's point.  Utilizing a #11 blade scalpel a stab incision was then made.  Utilizing a 5 mm Optiview trocar direct visualization was gained into the abdominal cavity.  Insufflation was then turned on.  3 additional  robotic trocars were then placed under direct visualization.  One at the right subcostal.  1 infraumbilically.  1 at the left lower quadrant.  I then exchanged the 5 mm Optiview for a fourth robotic 8 mm trocar.   Inflation was then placed at the Hill's point port.  Patient was then placed in reverse Trendelenburg and airplane to the left.  Instruments were then placed in direct visualization.  The right subcostal had the fenestrated bipolar.  The infraumbilical has the 30-degree camera.  The left lower quadrant with the hook cautery.  And the Hill's point port was a Cadiere's.  The Cadiere was used to hold the gallbladder fundus cephalad and to the right shoulder.  The forced bipolar was used to retract the infundibulum.  [place] of [place]ot dissection was initiated.  Cystic duct was clearly identified and skeletonized.  The cystic artery was then also skeletonized and identified.  Triangle of safety was confirmed.  ICG cholangiography. was turned on.  Further confirm the adequate anatomy.  Clips were then placed 1 proximally and 1 distally at the cystic duct.  Similar fashion was placed on the cystic artery.  Utilizing hook cautery on cut setting, both were ligated.  The gallbladder was removed from the liver bed utilizing hook cautery.  Hemostasis was ensured.   A 5 mm bag was then used through the Hill's point trocar, the gallbladder was removed after enlarging the incision.  Gallbladder specimen was then sent.  Final survey, noted hemostasis was achieved; no bile leakage was noted and confirmed with ICG cholangiography.  All instruments were then removed.  The robot was then undocked.  A [place] with a Guerrero-Terrie was tused to close the Hill's point fascia trans fascially.  Patient tolerated the procedure well.  All counts were correct x2.  All of the trocars were removed.  Insufflation was then completely reduced.  The incisions were closed with 4-0 Monocryl in a simple erupted buried fashion.  Skin  glue was then applied.              [facility] DO Badillo FACOS    Disclaimer: This note consists of words and symbols derived from keyboarding and dictation using voice recognition software.  As a result, there may be errors that have gone undetected.  Please consider this when interpreting information found in this note.

## 2024-05-02 NOTE — INTERVAL H&P NOTE
"I have reviewed the surgical (or preoperative) H&P that is linked to this encounter, and examined the patient. There are no significant changes    Clinical Conditions Present on Arrival:  Clinically Significant Risk Factors Present on Admission                  # Overweight: Estimated body mass index is 28.12 kg/m  as calculated from the following:    Height as of this encounter: 1.651 m (5' 5\").    Weight as of this encounter: 76.7 kg (169 lb).       "

## 2024-05-02 NOTE — ANESTHESIA PROCEDURE NOTES
Airway         Procedure Start/Stop Times: 5/2/2024 2:34 PM  Staff -        CRNA: Adan Villarreal APRN CRNA       Performed By: CRNA  Consent for Airway        Urgency: elective  Indications and Patient Condition       Indications for airway management: christopher-procedural       Induction type:intravenous       Mask difficulty assessment: 1 - vent by mask    Final Airway Details       Final airway type: endotracheal airway       Successful airway: ETT - single  Endotracheal Airway Details        ETT size (mm): 7.0       Cuffed: yes       Cuff volume (mL): 5       Successful intubation technique: video laryngoscopy       VL Blade Size: MAC 3       Grade View of Cords: 1       Adjucts: stylet       Position: Right       Measured from: gums/teeth       Secured at (cm): 21       Bite block used: None    Post intubation assessment        Placement verified by: capnometry, equal breath sounds and chest rise        Number of attempts at approach: 1       Secured with: tape       Ease of procedure: easy       Dentition: Intact       Dental guard used and removed.    Medication(s) Administered   Medication Administration Time: 5/2/2024 2:34 PM

## 2024-05-02 NOTE — DISCHARGE INSTRUCTIONS
Home Care Following Gallbladder Surgery     [facility]    Pain meds:   600mg ibuprofen every 6hrs prn   650mg of acetaminophen every 6hrs prn  You can alternate these meds so that you take one every 3 hrs.    Make sure you do not go over 4000mg of acetaminophen every 24hrs, especially if you are taking Norco or percocet 5/325mg.    Care of the Incision:  Remove gauze dressing (if present) after 48 hours.  Leave small strips in place (if present).  They will gradually come off.  If surgical glue was used on your incision, keep it dry for 24 hours.  Then you may shower but don t submerge under water for at least 2 weeks.  Gently pat your incision dry with a freshly laundered towel.  Do not touch your incision with bare hands or pick at scabs.  Leave your incision open to air.  Cover it only if draining, clothing rubs or irritates it.    Activity:  Gradually increase your activity.  Walk short distances several times each day and increase the distance as your strength allows.  To promote circulation, do not cross your legs while sitting.  No strenuous lifting or straining for 2-3 weeks.  Do not lift anything over 10-20 pounds until your doctor approves an increase.  Return to work will be determined by the type of work you do and should be discussed with your physician.  Do not drive or operate equipment while taking prescription pain medicines.  You may drive 1 week after surgery if you have stopped taking prescription pain medicines and can react quickly enough to make an emergency stop if necessary.    Diet:  Return to the diet you were on before surgery.  Drink plenty of water.  Avoid foods that cause constipation.    REMEMBER--most prescription pain pills cause constipation.  Walking, extra fluids, and increased fiber (fresh fruits and vegetables, etc.) are natural remedies for constipation.  You can also take mineral oil, 1-2 Tablespoons per day.  If still constipated you may try a stool softener such as  Colace or Miralax.    Call Your Physician if You Have:  Redness, increased swelling or cloudy drainage from your incision.  A temperature of more than 101 degrees F.  Worsening pain in your incision not relieved by your prescription pain pills and/or a short rest.  Jaundice (yellowing of skin or eyes)  Abdominal distention (stomach getting very large)  Swelling in your legs  Productive cough  Burning with urination  Any questions or concerns about your recovery, please call       Business hours ([phone]     After hours [phone]  Nurse Advice Line (24 hours a day)    Follow-up Care:  Make an appointment 1-2 week after your surgery.  Call [phone].                           Same Day Surgery Discharge Instructions  Special Precautions After Surgery - Adult    It is not unusual to feel lightheaded or faint, up to 24 hours after surgery or while taking pain medication.  If you have these symptoms; sit for a few minutes before standing and have someone assist you when getting up.  You should rest and relax for the next 24 hours and must have someone stay with you for at least 24 hours after your discharge.  DO NOT DRIVE any vehicle or operate mechanical equipment for 24 hours following the end of your surgery.  DO NOT DRIVE while taking narcotic pain medications that have been prescribed by your physician.  If you had a limb operated on, you must be able to use it fully to drive.  DO NOT drink alcoholic beverages for 24 hours following surgery or while taking prescription pain medication.  Drink clear liquids (apple juice, ginger ale, broth, 7-Up, etc.).  Progress to your regular diet as you feel able.  Any questions call your physician and do not make important decisions for 24 hours.    Nausea and Vomiting: Nausea and vomiting can occur any time after receiving anesthesia. If you experience nausea and vomiting we encourage you to move to a clear liquid diet and advance your diet as tolerated. If nausea  and vomiting do not improve within 12 hours please call the surgeon or present to the Emergency department.     Break-through Bleeding: If your experience bleeding from your surgical site apply pressure and additional dressing per nurse instruction. For simple problems such as a saturated dressing, you may need to reinforce the dressing with more gauze and tape and put slight pressure on the site. If bleeding does not subside contact the surgeon or present to the Emergency Department.    Post-op Infection: If you develop a fever of 100.4 or greater, have pus like drainage, redness, swelling or severe pain at the surgical site not alleviated with pain medications; please contact the surgeon or present to the Emergency Department.     Medications:  Acetaminophen (Tylenol):  Next dose: 6:30 PM.  Ibuprofen (Motrin, Advil):  Next dose: 10:30 PM.  Follow the instructions on the bottle.  __________________________________________________________________________________________________________________________________  IMPORTANT NUMBERS:    [facility] Main Number:  [phone], [phone]  Pharmacy:  [phone]  Same Day Surgery:  [phone], for general post-op questions call Monday - Thursday until 8:30 p.m., Fridays until 6:00 p.m.  Nurse Advice Line:  [phone]                                                                         [facility]:  [phone]

## 2024-05-02 NOTE — ANESTHESIA POSTPROCEDURE EVALUATION
Patient: Marian Sanon    Procedure: Procedure(s):  CHOLECYSTECTOMY, ROBOT-ASSISTED, LAPAROSCOPIC, USING DA NESSA XI       Anesthesia Type:  No value filed.    Note:  Disposition: Outpatient   Postop Pain Control: Uneventful            Sign Out: Well controlled pain   PONV: No   Neuro/Psych: Uneventful            Sign Out: Acceptable/Baseline neuro status   Airway/Respiratory: Uneventful            Sign Out: Acceptable/Baseline resp. status   CV/Hemodynamics: Uneventful            Sign Out: Acceptable CV status; No obvious hypovolemia; No obvious fluid overload   Other NRE:    DID A NON-ROUTINE EVENT OCCUR?            Last vitals:  Vitals Value Taken Time   /60 05/02/24 1630   Temp 36.5  C (97.7  F) 05/02/24 1641   Pulse 82 05/02/24 1641   Resp 13 05/02/24 1641   SpO2 95 % 05/02/24 1641   Vitals shown include unfiled device data.    Electronically Signed By: VAISHALI Rasheed CRNA  May 2, 2024  4:43 PM

## 2024-05-06 LAB
PATH REPORT.COMMENTS IMP SPEC: NORMAL
PATH REPORT.COMMENTS IMP SPEC: NORMAL
PATH REPORT.FINAL DX SPEC: NORMAL
PATH REPORT.GROSS SPEC: NORMAL
PATH REPORT.MICROSCOPIC SPEC OTHER STN: NORMAL
PATH REPORT.RELEVANT HX SPEC: NORMAL
PHOTO IMAGE: NORMAL

## 2024-05-21 ENCOUNTER — OFFICE VISIT (OUTPATIENT)
Dept: SURGERY | Facility: CLINIC | Age: 29
End: 2024-05-21
Payer: COMMERCIAL

## 2024-05-21 VITALS
OXYGEN SATURATION: 97 % | HEART RATE: 64 BPM | DIASTOLIC BLOOD PRESSURE: 74 MMHG | SYSTOLIC BLOOD PRESSURE: 108 MMHG | TEMPERATURE: 98 F

## 2024-05-21 DIAGNOSIS — Z90.49 S/P CHOLECYSTECTOMY: Primary | ICD-10-CM

## 2024-05-21 PROCEDURE — 99024 POSTOP FOLLOW-UP VISIT: CPT | Performed by: PHYSICIAN ASSISTANT

## 2024-05-21 RX ORDER — DIAZEPAM 5 MG
TABLET ORAL
COMMUNITY
Start: 2023-06-06

## 2024-05-21 RX ORDER — DESOGESTREL AND ETHINYL ESTRADIOL 21-5 (28)
KIT ORAL
COMMUNITY
Start: 2023-06-01

## 2024-05-21 RX ORDER — NEEDLES, DISPOSABLE 25GX5/8"
NEEDLE, DISPOSABLE MISCELLANEOUS
COMMUNITY
Start: 2023-08-08

## 2024-05-21 RX ORDER — PRENATAL VIT/IRON FUM/FOLIC AC 27MG-0.8MG
TABLET ORAL
COMMUNITY
Start: 2023-04-01

## 2024-05-21 RX ORDER — ESTRADIOL 2 MG/1
TABLET ORAL
COMMUNITY
Start: 2023-07-22

## 2024-05-21 RX ORDER — FERROUS SULFATE 325(65) MG
TABLET ORAL
COMMUNITY
Start: 2023-09-22

## 2024-05-21 RX ORDER — METHYLPREDNISOLONE 4 MG/1
TABLET ORAL
COMMUNITY
Start: 2023-06-06

## 2024-05-21 RX ORDER — SYRINGE WITH NEEDLE, 1 ML 25GX5/8"
SYRINGE, EMPTY DISPOSABLE MISCELLANEOUS
COMMUNITY
Start: 2023-08-08

## 2024-05-21 NOTE — NURSING NOTE
"Initial /74 (BP Location: Right arm, Patient Position: Right side, Cuff Size: Adult Regular)   Pulse 64   Temp 98  F (36.7  C)   LMP  (LMP Unknown)   SpO2 97%  Estimated body mass index is 28.12 kg/m  as calculated from the following:    Height as of 5/2/24: 1.651 m (5' 5\").    Weight as of 5/2/24: 76.7 kg (169 lb). .  Roxie Adrian LPN on 5/21/2024 at 3:20 PM    "

## 2024-05-21 NOTE — LETTER
5/21/2024         RE: Marian Miller  [address] [facility] [address]        Dear Colleague,    Thank you for referring your patient, Marian Miller, to the [facility]. Please see a copy of my visit note below.    Surgery Post-op Note  [facility]  [address] [place] [address]  T: [phone]  F: [phone]    Subjective:     Marian Miller presents to the clinic after undergoing robot assisted cholecystectomy on 5/2/2024 with Dr. Badillo.  Patient is here for follow up. The patient reports no incisional pain.  Patient is currently tolerating regular diet.  Patient denies significant nausea or vomiting.        Objective:     Vitals:   /74 (BP Location: Right arm, Patient Position: Right side, Cuff Size: Adult Regular)   Pulse 64   Temp 98  F (36.7  C)   LMP  (LMP Unknown)   SpO2 97%    General Appearance:    Marian is a well-appearing 29 year old  female who is in no acute distress.   Cardiac:    Skin is warm and dry     Pulmonary:   Breathing is nonlabored   Abdomen:    Soft, nontender, nondistended. Incisions are c/d/I, no RUQ tenderness to palpation.    Incision: The incision site is healing  well. There are no signs of cellulitis or drainage.        Labs/Imaging:     No new labs or imaging        Pathology:     Final Diagnosis   A(1). Gallbladder, cholecystectomy:  -Chronic cholecystitis, and cholelithiasis.  -Negative for dysplasia or malignancy.        Assessment:     Ms.Bailey ESME Miller is status post robotic cholecystectomy doing well .        Plan:     1. The patient is instructed to call the office if there is any increasing incisional pain, swelling, redness, drainage, or any other problems.   2. Follow up as needed for any new or worsening issues, questions, concerns or symptoms      Again, thank you for allowing me to participate in the care of your patient.         Sincerely,        MARLENI BATISTA PA-C

## 2024-05-23 NOTE — PROGRESS NOTES
Surgery Post-op Note  [facility]  [address] [place] [address]  T: [phone]  F: [phone]    Subjective:     Marian Miller presents to the clinic after undergoing robot assisted cholecystectomy on 5/2/2024 with Dr. Badillo.  Patient is here for follow up. The patient reports no incisional pain.  Patient is currently tolerating regular diet.  Patient denies significant nausea or vomiting.        Objective:     Vitals:   /74 (BP Location: Right arm, Patient Position: Right side, Cuff Size: Adult Regular)   Pulse 64   Temp 98  F (36.7  C)   LMP  (LMP Unknown)   SpO2 97%    General Appearance:    Marian is a well-appearing 29 year old  female who is in no acute distress.   Cardiac:    Skin is warm and dry     Pulmonary:   Breathing is nonlabored   Abdomen:    Soft, nontender, nondistended. Incisions are c/d/I, no RUQ tenderness to palpation.    Incision: The incision site is healing  well. There are no signs of cellulitis or drainage.        Labs/Imaging:     No new labs or imaging        Pathology:     Final Diagnosis   A(1). Gallbladder, cholecystectomy:  -Chronic cholecystitis, and cholelithiasis.  -Negative for dysplasia or malignancy.        Assessment:     Ms.Bailey ESME Miller is status post robotic cholecystectomy doing well .        Plan:     1. The patient is instructed to call the office if there is any increasing incisional pain, swelling, redness, drainage, or any other problems.   2. Follow up as needed for any new or worsening issues, questions, concerns or symptoms

## 2024-06-16 ENCOUNTER — HEALTH MAINTENANCE LETTER (OUTPATIENT)
Age: 29
End: 2024-06-16

## 2025-06-21 ENCOUNTER — HEALTH MAINTENANCE LETTER (OUTPATIENT)
Age: 30
End: 2025-06-21

## (undated) DEVICE — PREP CHLORAPREP 26ML TINTED ORANGE  260815

## (undated) DEVICE — GLOVE BIOGEL PI ULTRATOUCH G SZ 8.0 42180

## (undated) DEVICE — DRAPE TIBURON GENERAL ENDOSCOPY 9458

## (undated) DEVICE — DAVINCI XI CLIP APPLIER MED HEM-O-LOK GREEN 470327

## (undated) DEVICE — STOCKING SLEEVE COMPRESSION CALF MED

## (undated) DEVICE — DAVINCI XI FCP CADIERE 8MM ENDOWRIST 471049

## (undated) DEVICE — DAVINCI XI CAUTERY HOOK 470183

## (undated) DEVICE — ANTIFOG SOLUTION SEE SHARP 150M TROCAR SWABS 30978

## (undated) DEVICE — ENDO TROCAR FIRST ENTRY KII FIOS Z-THRD 05X100MM CTF03

## (undated) DEVICE — DAVINCI XI DRAPE ARM 470015

## (undated) DEVICE — SU MONOCRYL 4-0 PS-2 18" UND Y496G

## (undated) DEVICE — GOWN LG DISP 9515

## (undated) DEVICE — CLIP ENDO HEMO-LOC GREEN MED/LG 544230

## (undated) DEVICE — DAVINCI XI SEAL UNIVERSAL 5-12MM 470500

## (undated) DEVICE — DAVINCI XI OBTURATOR BLADELESS 8MM 470359

## (undated) DEVICE — GLOVE BIOGEL PI MICRO INDICATOR UNDERGLOVE SZ 6.5 48965

## (undated) DEVICE — FILTER LAPROSHIELD SMOKE EVAC LSF1

## (undated) DEVICE — GLOVE BIOGEL PI MICRO INDICATOR UNDERGLOVE SZ 6.0 48960

## (undated) DEVICE — DAVINCI XI ESU FORCE BIPOLAR 8MM 471405

## (undated) DEVICE — SYR 30ML SLIP TIP W/O NDL 302833

## (undated) DEVICE — GLOVE BIOGEL PI MICRO INDICATOR UNDERGLOVE SZ 7.0 48970

## (undated) DEVICE — GLOVE BIOGEL PI MICRO SZ 6.5 48565

## (undated) DEVICE — KIT PATIENT POSITIONING PIGAZZI LATEX FREE 40580

## (undated) DEVICE — DEVICE SUTURE PASSER 14GA WECK EFX EFXSP2

## (undated) DEVICE — GOWN XLG DISP 9545

## (undated) DEVICE — GLOVE BIOGEL PI MICRO INDICATOR UNDERGLOVE SZ 8.0 48980

## (undated) DEVICE — GLOVE BIOGEL PI MICRO SZ 5.5 48555

## (undated) DEVICE — GLOVE BIOGEL PI ULTRATOUCH G SZ 7.5 42175

## (undated) DEVICE — DAVINCI XI DRAPE COLUMN 470341

## (undated) DEVICE — SU DERMABOND ADVANCED .7ML DNX12

## (undated) DEVICE — Device

## (undated) DEVICE — ENDO POUCH UNIVERSAL RETRIEVAL SYSTEM INZII 5MM CD003

## (undated) DEVICE — ESU HOLSTER PLASTIC DISP E2400

## (undated) RX ORDER — GABAPENTIN 100 MG/1
CAPSULE ORAL
Status: DISPENSED
Start: 2024-05-02

## (undated) RX ORDER — FENTANYL CITRATE 50 UG/ML
INJECTION, SOLUTION INTRAMUSCULAR; INTRAVENOUS
Status: DISPENSED
Start: 2024-05-02

## (undated) RX ORDER — ONDANSETRON 2 MG/ML
INJECTION INTRAMUSCULAR; INTRAVENOUS
Status: DISPENSED
Start: 2024-05-02

## (undated) RX ORDER — PROPOFOL 10 MG/ML
INJECTION, EMULSION INTRAVENOUS
Status: DISPENSED
Start: 2024-05-02

## (undated) RX ORDER — LIDOCAINE HYDROCHLORIDE AND EPINEPHRINE 10; 10 MG/ML; UG/ML
INJECTION, SOLUTION INFILTRATION; PERINEURAL
Status: DISPENSED
Start: 2024-05-02

## (undated) RX ORDER — HEPARIN SODIUM 5000 [USP'U]/.5ML
INJECTION, SOLUTION INTRAVENOUS; SUBCUTANEOUS
Status: DISPENSED
Start: 2024-05-02

## (undated) RX ORDER — ACETAMINOPHEN 325 MG/1
TABLET ORAL
Status: DISPENSED
Start: 2024-05-02

## (undated) RX ORDER — HYDROMORPHONE HCL IN WATER/PF 6 MG/30 ML
PATIENT CONTROLLED ANALGESIA SYRINGE INTRAVENOUS
Status: DISPENSED
Start: 2024-05-02

## (undated) RX ORDER — LIDOCAINE HYDROCHLORIDE 10 MG/ML
INJECTION, SOLUTION EPIDURAL; INFILTRATION; INTRACAUDAL; PERINEURAL
Status: DISPENSED
Start: 2024-05-02

## (undated) RX ORDER — DEXAMETHASONE SODIUM PHOSPHATE 4 MG/ML
INJECTION, SOLUTION INTRA-ARTICULAR; INTRALESIONAL; INTRAMUSCULAR; INTRAVENOUS; SOFT TISSUE
Status: DISPENSED
Start: 2024-05-02

## (undated) RX ORDER — MAGNESIUM SULFATE HEPTAHYDRATE 40 MG/ML
INJECTION, SOLUTION INTRAVENOUS
Status: DISPENSED
Start: 2024-05-02